# Patient Record
Sex: FEMALE | NOT HISPANIC OR LATINO | URBAN - METROPOLITAN AREA
[De-identification: names, ages, dates, MRNs, and addresses within clinical notes are randomized per-mention and may not be internally consistent; named-entity substitution may affect disease eponyms.]

---

## 2017-09-25 ENCOUNTER — HOSPITAL ENCOUNTER (OUTPATIENT)
Dept: MAMMOGRAPHY | Facility: CLINIC | Age: 57
Discharge: HOME | End: 2017-09-25

## 2017-09-25 DIAGNOSIS — Z12.31 VISIT FOR SCREENING MAMMOGRAM: ICD-10-CM

## 2018-10-02 ENCOUNTER — HOSPITAL ENCOUNTER (OUTPATIENT)
Dept: MAMMOGRAPHY | Facility: CLINIC | Age: 58
Discharge: HOME | End: 2018-10-02

## 2018-10-02 DIAGNOSIS — Z12.31 VISIT FOR SCREENING MAMMOGRAM: ICD-10-CM

## 2018-11-26 ENCOUNTER — TRANSFERRED RECORDS (OUTPATIENT)
Dept: HEALTH INFORMATION MANAGEMENT | Facility: CLINIC | Age: 58
End: 2018-11-26

## 2019-03-08 ENCOUNTER — TRANSFERRED RECORDS (OUTPATIENT)
Dept: HEALTH INFORMATION MANAGEMENT | Facility: CLINIC | Age: 59
End: 2019-03-08

## 2019-10-14 ENCOUNTER — HOSPITAL ENCOUNTER (OUTPATIENT)
Dept: MAMMOGRAPHY | Facility: CLINIC | Age: 59
Discharge: HOME | End: 2019-10-14

## 2019-10-14 ENCOUNTER — COMMUNICATION - HEALTHEAST (OUTPATIENT)
Dept: TELEHEALTH | Facility: CLINIC | Age: 59
End: 2019-10-14

## 2019-10-14 DIAGNOSIS — Z12.31 SCREENING MAMMOGRAM, ENCOUNTER FOR: ICD-10-CM

## 2020-03-18 ENCOUNTER — TRANSFERRED RECORDS (OUTPATIENT)
Dept: HEALTH INFORMATION MANAGEMENT | Facility: CLINIC | Age: 60
End: 2020-03-18

## 2020-09-01 ENCOUNTER — OFFICE VISIT (OUTPATIENT)
Age: 60
End: 2020-09-01

## 2021-02-26 ENCOUNTER — TRANSFERRED RECORDS (OUTPATIENT)
Dept: HEALTH INFORMATION MANAGEMENT | Facility: CLINIC | Age: 61
End: 2021-02-26

## 2021-03-05 ENCOUNTER — OFFICE VISIT (OUTPATIENT)
Dept: URBAN - METROPOLITAN AREA CLINIC 7 | Facility: CLINIC | Age: 61
End: 2021-03-05

## 2021-03-05 ENCOUNTER — LAB OUTSIDE AN ENCOUNTER (OUTPATIENT)
Age: 61
End: 2021-03-05

## 2021-03-05 LAB — MEDICARE: FECAL-OCCULT BLOOD TEST: POSITIVE

## 2021-03-10 ENCOUNTER — OFFICE VISIT (OUTPATIENT)
Dept: URBAN - METROPOLITAN AREA CLINIC 7 | Facility: CLINIC | Age: 61
End: 2021-03-10

## 2021-03-16 ENCOUNTER — OFFICE VISIT (OUTPATIENT)
Dept: URBAN - METROPOLITAN AREA CLINIC 9 | Facility: CLINIC | Age: 61
End: 2021-03-16

## 2021-03-23 ENCOUNTER — OFFICE VISIT (OUTPATIENT)
Dept: URBAN - METROPOLITAN AREA SURGERY CENTER 9 | Facility: SURGERY CENTER | Age: 61
End: 2021-03-23

## 2021-03-25 ENCOUNTER — TELEPHONE ENCOUNTER (OUTPATIENT)
Dept: URBAN - METROPOLITAN AREA CLINIC 9 | Facility: CLINIC | Age: 61
End: 2021-03-25

## 2021-04-01 ENCOUNTER — TELEPHONE ENCOUNTER (OUTPATIENT)
Dept: URBAN - METROPOLITAN AREA CLINIC 9 | Facility: CLINIC | Age: 61
End: 2021-04-01

## 2021-05-01 ENCOUNTER — OFFICE VISIT (OUTPATIENT)
Age: 61
End: 2021-05-01

## 2021-05-05 ENCOUNTER — TELEPHONE ENCOUNTER (OUTPATIENT)
Dept: URBAN - METROPOLITAN AREA CLINIC 9 | Facility: CLINIC | Age: 61
End: 2021-05-05

## 2021-05-06 ENCOUNTER — OFFICE VISIT (OUTPATIENT)
Dept: URBAN - METROPOLITAN AREA CLINIC 9 | Facility: CLINIC | Age: 61
End: 2021-05-06

## 2021-05-24 ENCOUNTER — RECORDS - HEALTHEAST (OUTPATIENT)
Dept: ADMINISTRATIVE | Facility: CLINIC | Age: 61
End: 2021-05-24

## 2021-05-25 ENCOUNTER — RECORDS - HEALTHEAST (OUTPATIENT)
Dept: ADMINISTRATIVE | Facility: CLINIC | Age: 61
End: 2021-05-25

## 2021-05-26 ENCOUNTER — RECORDS - HEALTHEAST (OUTPATIENT)
Dept: ADMINISTRATIVE | Facility: CLINIC | Age: 61
End: 2021-05-26

## 2021-05-27 ENCOUNTER — RECORDS - HEALTHEAST (OUTPATIENT)
Dept: ADMINISTRATIVE | Facility: CLINIC | Age: 61
End: 2021-05-27

## 2021-05-28 ENCOUNTER — RECORDS - HEALTHEAST (OUTPATIENT)
Dept: ADMINISTRATIVE | Facility: CLINIC | Age: 61
End: 2021-05-28

## 2021-05-29 ENCOUNTER — RECORDS - HEALTHEAST (OUTPATIENT)
Dept: ADMINISTRATIVE | Facility: CLINIC | Age: 61
End: 2021-05-29

## 2021-05-30 ENCOUNTER — RECORDS - HEALTHEAST (OUTPATIENT)
Dept: ADMINISTRATIVE | Facility: CLINIC | Age: 61
End: 2021-05-30

## 2021-06-15 ENCOUNTER — TELEPHONE ENCOUNTER (OUTPATIENT)
Dept: URBAN - METROPOLITAN AREA CLINIC 9 | Facility: CLINIC | Age: 61
End: 2021-06-15

## 2021-07-13 ENCOUNTER — RECORDS - HEALTHEAST (OUTPATIENT)
Dept: ADMINISTRATIVE | Facility: CLINIC | Age: 61
End: 2021-07-13

## 2021-07-21 ENCOUNTER — RECORDS - HEALTHEAST (OUTPATIENT)
Dept: ADMINISTRATIVE | Facility: CLINIC | Age: 61
End: 2021-07-21

## 2021-07-22 ENCOUNTER — RECORDS - HEALTHEAST (OUTPATIENT)
Dept: SCHEDULING | Facility: CLINIC | Age: 61
End: 2021-07-22

## 2021-07-22 DIAGNOSIS — Z12.31 OTHER SCREENING MAMMOGRAM: ICD-10-CM

## 2021-07-23 ENCOUNTER — RECORDS - HEALTHEAST (OUTPATIENT)
Dept: MAMMOGRAPHY | Facility: CLINIC | Age: 61
End: 2021-07-23

## 2021-07-23 DIAGNOSIS — N64.89 OTHER BREAST DISORDERS: ICD-10-CM

## 2021-07-23 DIAGNOSIS — Q84.9 CONGENITAL ANOMALY OF INTEGUMENT: ICD-10-CM

## 2021-10-29 ENCOUNTER — TRANSFERRED RECORDS (OUTPATIENT)
Dept: HEALTH INFORMATION MANAGEMENT | Facility: CLINIC | Age: 61
End: 2021-10-29

## 2021-10-29 ENCOUNTER — LAB REQUISITION (OUTPATIENT)
Dept: LAB | Facility: CLINIC | Age: 61
End: 2021-10-29

## 2021-10-29 DIAGNOSIS — K62.5 HEMORRHAGE OF ANUS AND RECTUM: ICD-10-CM

## 2021-10-29 DIAGNOSIS — R53.83 OTHER FATIGUE: ICD-10-CM

## 2021-10-29 LAB
ALBUMIN SERPL-MCNC: 3.6 G/DL (ref 3.5–5)
ALP SERPL-CCNC: 38 U/L (ref 45–120)
ALT SERPL W P-5'-P-CCNC: 22 U/L (ref 0–45)
ANION GAP SERPL CALCULATED.3IONS-SCNC: 12 MMOL/L (ref 5–18)
AST SERPL W P-5'-P-CCNC: 20 U/L (ref 0–40)
BILIRUB SERPL-MCNC: 0.3 MG/DL (ref 0–1)
BUN SERPL-MCNC: 5 MG/DL (ref 8–22)
CALCIUM SERPL-MCNC: 9.1 MG/DL (ref 8.5–10.5)
CHLORIDE BLD-SCNC: 109 MMOL/L (ref 98–107)
CO2 SERPL-SCNC: 21 MMOL/L (ref 22–31)
CREAT SERPL-MCNC: 0.73 MG/DL (ref 0.6–1.1)
FERRITIN SERPL-MCNC: 18 NG/ML (ref 10–130)
GFR SERPL CREATININE-BSD FRML MDRD: 90 ML/MIN/1.73M2
GLUCOSE BLD-MCNC: 99 MG/DL (ref 70–125)
IRON SATN MFR SERPL: 7 % (ref 20–50)
IRON SERPL-MCNC: 22 UG/DL (ref 42–175)
POTASSIUM BLD-SCNC: 3.7 MMOL/L (ref 3.5–5)
PROT SERPL-MCNC: 6.4 G/DL (ref 6–8)
SODIUM SERPL-SCNC: 142 MMOL/L (ref 136–145)
TIBC SERPL-MCNC: 321 UG/DL (ref 313–563)
TRANSFERRIN SERPL-MCNC: 257 MG/DL (ref 212–360)
TSH SERPL DL<=0.005 MIU/L-ACNC: 1.09 UIU/ML (ref 0.3–5)
VIT B12 SERPL-MCNC: 617 PG/ML (ref 213–816)

## 2021-10-29 PROCEDURE — 82306 VITAMIN D 25 HYDROXY: CPT | Performed by: NURSE PRACTITIONER

## 2021-10-29 PROCEDURE — 83540 ASSAY OF IRON: CPT | Performed by: NURSE PRACTITIONER

## 2021-10-29 PROCEDURE — 82728 ASSAY OF FERRITIN: CPT | Performed by: NURSE PRACTITIONER

## 2021-10-29 PROCEDURE — 84443 ASSAY THYROID STIM HORMONE: CPT | Performed by: NURSE PRACTITIONER

## 2021-10-29 PROCEDURE — 82607 VITAMIN B-12: CPT | Performed by: NURSE PRACTITIONER

## 2021-10-29 PROCEDURE — 80053 COMPREHEN METABOLIC PANEL: CPT | Performed by: NURSE PRACTITIONER

## 2021-10-30 LAB — DEPRECATED CALCIDIOL+CALCIFEROL SERPL-MC: 46 UG/L (ref 30–80)

## 2021-11-08 ENCOUNTER — TRANSFERRED RECORDS (OUTPATIENT)
Dept: HEALTH INFORMATION MANAGEMENT | Facility: CLINIC | Age: 61
End: 2021-11-08

## 2021-11-10 ENCOUNTER — TELEPHONE ENCOUNTER (OUTPATIENT)
Dept: URBAN - METROPOLITAN AREA CLINIC 9 | Facility: CLINIC | Age: 61
End: 2021-11-10

## 2021-11-22 ENCOUNTER — OFFICE VISIT (OUTPATIENT)
Dept: URBAN - METROPOLITAN AREA CLINIC 9 | Facility: CLINIC | Age: 61
End: 2021-11-22

## 2021-11-22 ENCOUNTER — TELEPHONE ENCOUNTER (OUTPATIENT)
Dept: URBAN - METROPOLITAN AREA CLINIC 9 | Facility: CLINIC | Age: 61
End: 2021-11-22

## 2021-11-24 ENCOUNTER — OFFICE VISIT (OUTPATIENT)
Dept: URBAN - METROPOLITAN AREA SURGERY CENTER 9 | Facility: SURGERY CENTER | Age: 61
End: 2021-11-24

## 2021-12-01 ENCOUNTER — TELEPHONE ENCOUNTER (OUTPATIENT)
Dept: URBAN - METROPOLITAN AREA CLINIC 9 | Facility: CLINIC | Age: 61
End: 2021-12-01

## 2021-12-01 LAB — CALPROTECTIN, STOOL: (no result)

## 2021-12-02 ENCOUNTER — OFFICE VISIT (OUTPATIENT)
Dept: URBAN - METROPOLITAN AREA CLINIC 9 | Facility: CLINIC | Age: 61
End: 2021-12-02

## 2021-12-07 ENCOUNTER — OFFICE VISIT (OUTPATIENT)
Dept: URBAN - METROPOLITAN AREA CLINIC 9 | Facility: CLINIC | Age: 61
End: 2021-12-07

## 2021-12-08 ENCOUNTER — TELEPHONE ENCOUNTER (OUTPATIENT)
Dept: URBAN - METROPOLITAN AREA CLINIC 9 | Facility: CLINIC | Age: 61
End: 2021-12-08

## 2021-12-09 ENCOUNTER — TELEPHONE ENCOUNTER (OUTPATIENT)
Dept: URBAN - METROPOLITAN AREA CLINIC 9 | Facility: CLINIC | Age: 61
End: 2021-12-09

## 2021-12-10 ENCOUNTER — TELEPHONE ENCOUNTER (OUTPATIENT)
Dept: URBAN - METROPOLITAN AREA CLINIC 9 | Facility: CLINIC | Age: 61
End: 2021-12-10

## 2021-12-10 LAB
HEPATITIS A AB, TOTAL: (no result)
HEPATITIS A IGM: (no result)
HEPATITIS B CORE AB TOTAL: (no result)
HEPATITIS B CORE ANTIBODY (IGM): (no result)
HEPATITIS B SURFACE ANTIBODY QL: (no result)
HEPATITIS B SURFACE ANTIGEN: (no result)
MITOGEN-NIL: (no result)
NIL: (no result)
QUANTIFERON(R)-TB GOLD PLUS, 1 TUBE: NEGATIVE
TB1-NIL: (no result)
TB2-NIL: (no result)

## 2021-12-13 ENCOUNTER — TELEPHONE ENCOUNTER (OUTPATIENT)
Dept: URBAN - METROPOLITAN AREA CLINIC 9 | Facility: CLINIC | Age: 61
End: 2021-12-13

## 2021-12-20 ENCOUNTER — OFFICE VISIT (OUTPATIENT)
Dept: URBAN - METROPOLITAN AREA CLINIC 9 | Facility: CLINIC | Age: 61
End: 2021-12-20

## 2021-12-27 ENCOUNTER — TELEPHONE ENCOUNTER (OUTPATIENT)
Dept: URBAN - METROPOLITAN AREA CLINIC 9 | Facility: CLINIC | Age: 61
End: 2021-12-27

## 2022-01-03 ENCOUNTER — TELEPHONE ENCOUNTER (OUTPATIENT)
Dept: URBAN - METROPOLITAN AREA CLINIC 9 | Facility: CLINIC | Age: 62
End: 2022-01-03

## 2022-01-19 ENCOUNTER — LAB OUTSIDE AN ENCOUNTER (OUTPATIENT)
Age: 62
End: 2022-01-19

## 2022-01-24 ENCOUNTER — OFFICE VISIT (OUTPATIENT)
Dept: URBAN - METROPOLITAN AREA CLINIC 9 | Facility: CLINIC | Age: 62
End: 2022-01-24

## 2022-01-24 LAB — CALPROTECTIN, STOOL: (no result)

## 2022-01-26 ENCOUNTER — TELEPHONE ENCOUNTER (OUTPATIENT)
Dept: URBAN - METROPOLITAN AREA CLINIC 9 | Facility: CLINIC | Age: 62
End: 2022-01-26

## 2022-02-08 ENCOUNTER — TELEPHONE ENCOUNTER (OUTPATIENT)
Dept: URBAN - METROPOLITAN AREA CLINIC 9 | Facility: CLINIC | Age: 62
End: 2022-02-08

## 2022-02-17 ENCOUNTER — TELEPHONE ENCOUNTER (OUTPATIENT)
Dept: URBAN - METROPOLITAN AREA CLINIC 9 | Facility: CLINIC | Age: 62
End: 2022-02-17

## 2022-02-23 ENCOUNTER — TELEPHONE ENCOUNTER (OUTPATIENT)
Dept: URBAN - METROPOLITAN AREA CLINIC 9 | Facility: CLINIC | Age: 62
End: 2022-02-23

## 2022-03-08 ENCOUNTER — TRANSFERRED RECORDS (OUTPATIENT)
Dept: ADMINISTRATIVE | Facility: CLINIC | Age: 62
End: 2022-03-08

## 2022-03-16 ENCOUNTER — TELEPHONE ENCOUNTER (OUTPATIENT)
Dept: URBAN - METROPOLITAN AREA CLINIC 9 | Facility: CLINIC | Age: 62
End: 2022-03-16

## 2022-04-14 ENCOUNTER — LAB OUTSIDE AN ENCOUNTER (OUTPATIENT)
Age: 62
End: 2022-04-14

## 2022-04-18 ENCOUNTER — TELEPHONE ENCOUNTER (OUTPATIENT)
Dept: URBAN - METROPOLITAN AREA CLINIC 9 | Facility: CLINIC | Age: 62
End: 2022-04-18

## 2022-04-22 ENCOUNTER — TELEPHONE ENCOUNTER (OUTPATIENT)
Dept: URBAN - METROPOLITAN AREA CLINIC 9 | Facility: CLINIC | Age: 62
End: 2022-04-22

## 2022-04-22 LAB — CALPROTECTIN, STOOL: (no result)

## 2022-04-25 ENCOUNTER — OFFICE VISIT (OUTPATIENT)
Dept: URBAN - METROPOLITAN AREA CLINIC 9 | Facility: CLINIC | Age: 62
End: 2022-04-25

## 2022-05-06 ENCOUNTER — TELEPHONE ENCOUNTER (OUTPATIENT)
Dept: URBAN - METROPOLITAN AREA CLINIC 9 | Facility: CLINIC | Age: 62
End: 2022-05-06

## 2022-05-10 ENCOUNTER — TELEPHONE ENCOUNTER (OUTPATIENT)
Dept: URBAN - METROPOLITAN AREA CLINIC 9 | Facility: CLINIC | Age: 62
End: 2022-05-10

## 2022-05-11 ENCOUNTER — TELEPHONE ENCOUNTER (OUTPATIENT)
Dept: URBAN - METROPOLITAN AREA CLINIC 9 | Facility: CLINIC | Age: 62
End: 2022-05-11

## 2022-06-17 ENCOUNTER — TELEPHONE ENCOUNTER (OUTPATIENT)
Dept: URBAN - METROPOLITAN AREA CLINIC 9 | Facility: CLINIC | Age: 62
End: 2022-06-17

## 2022-07-06 ENCOUNTER — TELEPHONE ENCOUNTER (OUTPATIENT)
Dept: URBAN - METROPOLITAN AREA CLINIC 9 | Facility: CLINIC | Age: 62
End: 2022-07-06

## 2022-07-09 ENCOUNTER — TELEPHONE ENCOUNTER (OUTPATIENT)
Dept: URBAN - METROPOLITAN AREA CLINIC 121 | Facility: CLINIC | Age: 62
End: 2022-07-09

## 2022-07-10 ENCOUNTER — TELEPHONE ENCOUNTER (OUTPATIENT)
Dept: URBAN - METROPOLITAN AREA CLINIC 121 | Facility: CLINIC | Age: 62
End: 2022-07-10

## 2022-07-30 ENCOUNTER — TELEPHONE ENCOUNTER (OUTPATIENT)
Age: 62
End: 2022-07-30

## 2022-07-30 RX ORDER — SODIUM SULFATE, POTASSIUM SULFATE, MAGNESIUM SULFATE 17.5; 3.13; 1.6 G/ML; G/ML; G/ML
1 (ONE) SOLUTION, CONCENTRATE ORAL
Qty: 0 | Refills: 2 | OUTPATIENT
Start: 2021-11-22 | End: 2021-11-23

## 2022-07-30 RX ORDER — DICYCLOMINE HYDROCHLORIDE 10 MG/1
1 (ONE) CAPSULE ORAL
Qty: 0 | Refills: 2 | OUTPATIENT
Start: 2015-02-10 | End: 2015-03-12

## 2022-07-30 RX ORDER — MESALAMINE 800 MG/1
1 (ONE) TABLET, DELAYED RELEASE ORAL
Qty: 0 | Refills: 4 | OUTPATIENT
Start: 2021-12-01 | End: 2021-12-07

## 2022-07-30 RX ORDER — FAMOTIDINE 10 MG
1 (ONE) TABLET ORAL
Qty: 0 | Refills: 16 | OUTPATIENT
Start: 2017-01-06 | End: 2021-03-08

## 2022-07-30 RX ORDER — CIPROFLOXACIN HYDROCHLORIDE 500 MG/1
1 (ONE) TABLET, FILM COATED ORAL
Qty: 0 | Refills: 2 | OUTPATIENT
Start: 2016-04-27 | End: 2016-05-07

## 2022-07-30 RX ORDER — PREDNISONE 10 MG/1
1 (ONE) TABLET ORAL AS DIRECTED
Qty: 0 | Refills: 2 | OUTPATIENT
Start: 2021-11-24 | End: 2021-12-07

## 2022-07-30 RX ORDER — USTEKINUMAB 130 MG/26ML
390 SOLUTION INTRAVENOUS AS DIRECTED
Qty: 0 | Refills: 2 | OUTPATIENT
Start: 2022-02-28 | End: 2022-03-01

## 2022-07-30 RX ORDER — METRONIDAZOLE 375 MG/1
1 CAPSULE ORAL
Qty: 0 | Refills: 2 | OUTPATIENT
Start: 2012-11-06 | End: 2012-11-20

## 2022-07-30 RX ORDER — SULFASALAZINE 500 MG/1
1 (ONE) TABLET ORAL QID
Qty: 0 | Refills: 5 | OUTPATIENT
Start: 2021-11-24 | End: 2021-12-07

## 2022-07-30 RX ORDER — MESALAMINE 1000 MG/1
1 (ONE) SUPPOSITORY RECTAL QHS
Qty: 0 | Refills: 3 | OUTPATIENT
Start: 2021-11-17 | End: 2021-12-20

## 2022-07-30 RX ORDER — LINACLOTIDE 72 UG/1
1 (ONE) CAPSULE, GELATIN COATED ORAL
Qty: 0 | Refills: 2 | OUTPATIENT
Start: 2021-11-30 | End: 2021-12-07

## 2022-07-30 RX ORDER — NYSTATIN 100000 [USP'U]/ML
5 SUSPENSION ORAL
Qty: 0 | Refills: 2 | OUTPATIENT
Start: 2021-12-13 | End: 2021-12-20

## 2022-07-30 RX ORDER — MESALAMINE 1000 MG/1
1 (ONE) SUPPOSITORY RECTAL QHS
Qty: 0 | Refills: 3 | OUTPATIENT
Start: 2022-02-22 | End: 2022-04-25

## 2022-07-30 RX ORDER — METRONIDAZOLE 500 MG/1
1 (ONE) TABLET ORAL
Qty: 0 | Refills: 2 | OUTPATIENT
Start: 2016-04-27 | End: 2016-05-07

## 2022-07-30 RX ORDER — FOLIC ACID 1 MG/1
1 (ONE) TABLET ORAL DAILY
Qty: 0 | Refills: 3 | OUTPATIENT
Start: 2021-11-24 | End: 2021-12-07

## 2022-07-30 RX ORDER — DEXLANSOPRAZOLE 60 MG/1
1 (ONE) CAPSULE, DELAYED RELEASE ORAL DAILY
Qty: 0 | Refills: 2 | OUTPATIENT
Start: 2015-01-28 | End: 2015-02-07

## 2022-07-30 RX ORDER — CIPROFLOXACIN HCL 500 MG
1 (ONE) TABLET TABLET ORAL
Qty: 0 | Refills: 3 | OUTPATIENT
Start: 2012-11-06 | End: 2016-04-27

## 2022-07-30 RX ORDER — BUDESONIDE 3 MG/1
3 (THREE) CAPSULE, COATED PELLETS ORAL AS DIRECTED
Qty: 0 | Refills: 2 | OUTPATIENT
Start: 2021-12-07 | End: 2021-12-07

## 2022-07-30 RX ORDER — SODIUM SULFATE, POTASSIUM SULFATE, MAGNESIUM SULFATE 17.5; 3.13; 1.6 G/ML; G/ML; G/ML
1 (ONE) SOLUTION, CONCENTRATE ORAL
Qty: 0 | Refills: 2 | OUTPATIENT
Start: 2021-03-16 | End: 2021-03-17

## 2022-07-30 RX ORDER — LINACLOTIDE 290 UG/1
1 (ONE) CAPSULE, GELATIN COATED ORAL DAILY
Qty: 0 | Refills: 2 | OUTPATIENT
Start: 2021-12-07 | End: 2021-12-20

## 2022-07-31 ENCOUNTER — TELEPHONE ENCOUNTER (OUTPATIENT)
Age: 62
End: 2022-07-31

## 2022-07-31 RX ORDER — METRONIDAZOLE 500 MG/1
1 (ONE) TABLET ORAL
Qty: 0 | Refills: 2 | Status: ACTIVE | COMMUNITY
Start: 2016-04-27

## 2022-07-31 RX ORDER — PREDNISONE 10 MG/1
1 (ONE) TABLET ORAL AS DIRECTED
Qty: 0 | Refills: 2 | Status: ACTIVE | COMMUNITY
Start: 2021-11-24

## 2022-07-31 RX ORDER — FAMOTIDINE 10 MG
1 (ONE) TABLET ORAL
Qty: 0 | Refills: 16 | Status: ACTIVE | COMMUNITY
Start: 2017-01-06

## 2022-07-31 RX ORDER — MESALAMINE 1000 MG/1
1 (ONE) SUPPOSITORY RECTAL QHS
Qty: 0 | Refills: 3 | Status: ACTIVE | COMMUNITY
Start: 2021-03-23

## 2022-07-31 RX ORDER — MESALAMINE 1000 MG/1
1 (ONE) SUPPOSITORY RECTAL QHS
Qty: 0 | Refills: 3 | Status: ACTIVE | COMMUNITY
Start: 2021-11-17

## 2022-07-31 RX ORDER — SODIUM SULFATE, POTASSIUM SULFATE, MAGNESIUM SULFATE 17.5; 3.13; 1.6 G/ML; G/ML; G/ML
1 (ONE) SOLUTION, CONCENTRATE ORAL
Qty: 0 | Refills: 2 | Status: ACTIVE | COMMUNITY
Start: 2021-03-16

## 2022-07-31 RX ORDER — MESALAMINE 800 MG/1
1 (ONE) TABLET, DELAYED RELEASE ORAL
Qty: 0 | Refills: 4 | Status: ACTIVE | COMMUNITY
Start: 2021-11-30

## 2022-07-31 RX ORDER — LINACLOTIDE 290 UG/1
1 (ONE) CAPSULE, GELATIN COATED ORAL DAILY
Qty: 0 | Refills: 2 | Status: ACTIVE | COMMUNITY
Start: 2021-12-07

## 2022-07-31 RX ORDER — SODIUM SULFATE, POTASSIUM SULFATE, MAGNESIUM SULFATE 17.5; 3.13; 1.6 G/ML; G/ML; G/ML
1 (ONE) SOLUTION, CONCENTRATE ORAL
Qty: 0 | Refills: 2 | Status: ACTIVE | COMMUNITY
Start: 2021-11-22

## 2022-07-31 RX ORDER — SULFASALAZINE 500 MG/1
1 (ONE) TABLET ORAL QID
Qty: 0 | Refills: 5 | Status: ACTIVE | COMMUNITY
Start: 2021-11-24

## 2022-07-31 RX ORDER — DICYCLOMINE HYDROCHLORIDE 10 MG/1
1 (ONE) CAPSULE ORAL
Qty: 0 | Refills: 2 | Status: ACTIVE | COMMUNITY
Start: 2015-02-10

## 2022-07-31 RX ORDER — DEXLANSOPRAZOLE 60 MG/1
1 (ONE) CAPSULE, DELAYED RELEASE ORAL DAILY
Qty: 0 | Refills: 2 | Status: ACTIVE | COMMUNITY
Start: 2015-01-28

## 2022-07-31 RX ORDER — CIPROFLOXACIN HYDROCHLORIDE 500 MG/1
1 (ONE) TABLET, FILM COATED ORAL
Qty: 0 | Refills: 2 | Status: ACTIVE | COMMUNITY
Start: 2016-04-27

## 2022-07-31 RX ORDER — LINACLOTIDE 72 UG/1
1 (ONE) CAPSULE, GELATIN COATED ORAL
Qty: 0 | Refills: 2 | Status: ACTIVE | COMMUNITY
Start: 2021-11-30

## 2022-07-31 RX ORDER — USTEKINUMAB 90 MG/ML
90 INJECTION, SOLUTION SUBCUTANEOUS
Qty: 0 | Refills: 6 | Status: ACTIVE | COMMUNITY
Start: 2022-02-28

## 2022-07-31 RX ORDER — USTEKINUMAB 130 MG/26ML
SOLUTION INTRAVENOUS AS DIRECTED
Qty: 0 | Refills: 2 | Status: ACTIVE | COMMUNITY
Start: 2022-02-28

## 2022-07-31 RX ORDER — USTEKINUMAB 90 MG/ML
90 INJECTION, SOLUTION SUBCUTANEOUS
Qty: 0 | Refills: 2 | Status: ACTIVE | COMMUNITY
Start: 2022-07-06

## 2022-07-31 RX ORDER — BUDESONIDE 3 MG/1
3 (THREE) CAPSULE, COATED PELLETS ORAL AS DIRECTED
Qty: 0 | Refills: 2 | Status: ACTIVE | COMMUNITY
Start: 2021-12-07

## 2022-07-31 RX ORDER — NYSTATIN 100000 [USP'U]/ML
5 SUSPENSION ORAL
Qty: 0 | Refills: 2 | Status: ACTIVE | COMMUNITY
Start: 2021-12-13

## 2022-07-31 RX ORDER — FOLIC ACID 1 MG/1
1 (ONE) TABLET ORAL DAILY
Qty: 0 | Refills: 3 | Status: ACTIVE | COMMUNITY
Start: 2021-11-24

## 2022-07-31 RX ORDER — METRONIDAZOLE 375 MG/1
1 CAPSULE ORAL
Qty: 0 | Refills: 2 | Status: ACTIVE | COMMUNITY
Start: 2012-11-06

## 2022-07-31 RX ORDER — USTEKINUMAB 90 MG/ML
90 INJECTION, SOLUTION SUBCUTANEOUS
Qty: 0 | Refills: 2 | Status: ACTIVE | COMMUNITY
Start: 2022-03-16

## 2022-07-31 RX ORDER — MESALAMINE 1000 MG/1
1 (ONE) SUPPOSITORY RECTAL QHS
Qty: 0 | Refills: 3 | Status: ACTIVE | COMMUNITY
Start: 2022-02-22

## 2022-07-31 RX ORDER — MESALAMINE 800 MG/1
1 (ONE) TABLET, DELAYED RELEASE ORAL
Qty: 0 | Refills: 4 | Status: ACTIVE | COMMUNITY
Start: 2021-12-01

## 2022-07-31 RX ORDER — CIPROFLOXACIN HCL 500 MG
1 (ONE) TABLET ORAL
Qty: 0 | Refills: 3 | Status: ACTIVE | COMMUNITY
Start: 2012-11-06

## 2022-08-04 ENCOUNTER — TRANSFERRED RECORDS (OUTPATIENT)
Dept: HEALTH INFORMATION MANAGEMENT | Facility: CLINIC | Age: 62
End: 2022-08-04

## 2022-08-04 ENCOUNTER — LAB (OUTPATIENT)
Dept: LAB | Facility: HOSPITAL | Age: 62
End: 2022-08-04
Payer: COMMERCIAL

## 2022-08-04 DIAGNOSIS — J30.9 SPASMODIC RHINORRHEA: Primary | ICD-10-CM

## 2022-08-04 LAB
Lab: NORMAL
PERFORMING LABORATORY: NORMAL
SPECIMEN STATUS: NORMAL
TEST NAME: NORMAL

## 2022-08-04 PROCEDURE — 86003 ALLG SPEC IGE CRUDE XTRC EA: CPT

## 2022-08-04 PROCEDURE — 84999 UNLISTED CHEMISTRY PROCEDURE: CPT

## 2022-08-04 PROCEDURE — 36415 COLL VENOUS BLD VENIPUNCTURE: CPT

## 2022-08-05 LAB
DEPRECATED MISC ALLERGEN IGE RAST QL: NORMAL
MISCELLANEOUS TEST 1 (ARUP): NORMAL

## 2022-08-06 LAB
A ALTERNATA IGE QN: <0.1 KU(A)/L
A FUMIGATUS IGE QN: <0.1 KU(A)/L
BERMUDA GRASS IGE QN: 0.19 KU(A)/L
C HERBARUM IGE QN: <0.1 KU(A)/L
CAT DANDER IGG QN: <0.1 KU(A)/L
CEDAR IGE QN: 0.12 KU(A)/L
CEDAR IGE QN: 0.23 KU(A)/L
COCKSFOOT IGE QN: 0.23 KU(A)/L
COMMON RAGWEED IGE QN: 0.39 KU(A)/L
COTTONWOOD IGE QN: 0.23 KU(A)/L
D FARINAE IGE QN: 1.3 KU(A)/L
D PTERONYSS IGE QN: 0.75 KU(A)/L
DEPRECATED IGE QN: 0.17 KU(A)/L
DOG DANDER+EPITH IGE QN: <0.1 KU(A)/L
FIREBUSH IGE QN: 0.17 KU(A)/L
GIANT RAGWEED IGE QN: 0.26 KU(A)/L
GOOSEFOOT IGE QN: 0.24 KU(A)/L
MAPLE IGE QN: 0.22 KU(A)/L
MUGWORT IGE QN: 0.14 KU(A)/L
NETTLE IGE QN: 0.22 KU(A)/L
P NOTATUM IGE QN: <0.1 KU(A)/L
S ROSTRATA IGE QN: <0.1 KU(A)/L
SILVER BIRCH IGE QN: 0.21 KU(A)/L
TIMOTHY IGE QN: 0.23 KU(A)/L
WHITE ASH IGE QN: 0.22 KU(A)/L
WHITE ELM IGE QN: 0.2 KU(A)/L
WHITE OAK IGE QN: 0.2 KU(A)/L

## 2022-08-09 ENCOUNTER — TELEPHONE ENCOUNTER (OUTPATIENT)
Dept: URBAN - METROPOLITAN AREA CLINIC 9 | Facility: CLINIC | Age: 62
End: 2022-08-09

## 2022-08-15 ENCOUNTER — OFFICE VISIT (OUTPATIENT)
Dept: URBAN - METROPOLITAN AREA TELEHEALTH 1 | Facility: TELEHEALTH | Age: 62
End: 2022-08-15

## 2022-08-15 NOTE — HPI-TODAY'S VISIT:
62-year-old female here for follow-up.  We last saw her about 4 months ago.  At prior visits: The patient is a 61 year old female, patient presents today for Crohn's disease. 52-year-old female who carries a diagnosis of Crohn's colitis is here to establish care.  She was first diagnosed with Crohn's in 1999 when she had abdominal pain.  It looks like the colonoscopy at that time had proctitis as well as ileitis as well as transverse colon erythema.  Biopsies showed active and chronic inflammation.  She was intermittently put on 5 ASA products since that time.  She is also diverticulitis since that time.  She is not been on any other drugs.  She actually has subsequent normal colonoscopies in 2011 2015.  She went to see [facility] in 2017 in the what sounds like a balloon enteroscopy in a capsule and a colonoscopy and it sounds like they told her she had just chronic constipation.  She thinks she had of Crohn's flare at that time but they did not treat her with anything she was on Lialda at that time and she says they just told her to continue that.  Since January she has had rectal bleeding.  Is pretty significant at first.  She had been off the Lialda so she restarted it and she said that can a decrease the bleeding.  But then she started having joint pain so she stopped it.  Currently still having frequent rectal bleeding.  She suffers from chronic constipation but on her current regimen of magnesium and milk of magnesium she is actually having daily bowel movements.  Does not have diarrhea.  Does not have abdominal pain.  Does not have weight loss.  Denies family history of GI malignancies.  She does have acid reflux but is concerned to be on chronic omeprazole as she has osteoporosis.  Recent labs to include CBC and iron were normal   we proceeded with colonoscopy and CT enterography: colonoscopy was normal except for patchy erythema in rectum and Dc (in DC  may have been  due to diverticular colitis)segmental  colonic biopsies were normal.  She only had some rectal chronic inflammation on rectal biopsies.  CTE in was normal.  We did put her on Canasa do the chronic inflammation on the rectal biopsies. Then had follow-up: She is been on daily Canasa for over a month.  She says she still getting daily rectal bleeding.  She will have a bowel movement in the morning or 2 and these are normal but in the afternoon she will have some blood in her stool or stools also fairly liquid.  She has had negative reactions to almost all oral mesalamine products to include sulfasalazine and Lialda.  She feels like the rectal bleeding is getting better on the Canasa but she still having it. At last visit she really just had proctitis on colonoscopy.  We put her on a trial of 1 month of budesonide enemas.  We did talk about being on oral mesalamine products which she felt like this caused joint issues but if we do we can try sulfasalazine we also told her to take Benefiber twice a day.  We switched her from Prilosec to Pepcid  She then had follow-up:  She said she started having daily heavy rectal bleeding since August.  She went up to [place] and was seeing a doctor there and they put her on oral budesonide and this has not changed anything.  She was on that for 2 months.  She is also had a change in bowel habits.  She is usually constipated and she does take magnesium.  But she feels that she is having several loose stools in the morning about 4 this is different than before.  Again started in August.  She is actually been gaining weight.  Recent labs in November showed a ferritin of 15.  CMP was normal.  Most recent hemoglobin was 11.7 She also has a lot of kind of phlegm in the back of her throat and she is seeing ENT next week.  She tried to take oral iron but that caused a lot of GI upset as well worsening constipation.  She is also been on the Canasa which helps somewhat but has not really resolved her rectal and she had joint pains when she took both mesalamine and Lialda.  When she was on budesonide enemas in the past that did help for several months.  She has been treated with prednisone in the past and she said this did help her symptoms  We proceeded with colonoscopy which showed 25 cm of contingous erythematous mucosa  Biopsy showed up as chronic inflammation.  We tried her back on sulfasalazine and she said she had an immediate reaction.  She also said she was constipated and wanted to try Linzess.  The one thing that is helped her in the past is not oral budesonide but topical budesonide.  Fecal Daniele protectant was 925.  We put her on a trial of prednisone.   She then had follow-up..  She said the prednisone at 40 made her shaky and not feeling well so she went down to 20.  She was only on 40 for a be a week.  She also is taking Linzess 290.  She was having a problem with constipation.  She will actually went down to prednisone 20 and she had a bowel movement today.  She says she is unable to tolerate any mesalamine products including the sulfasalazine.  And she had side effects with the 40 of prednisone.  Also they did not give her iron at [facility] as she is had anaphylaxis in the past to meds and she just on oral iron  We tried her on oral budesonide and she said she had side effects from this and could only tolerate 1 pill a day instead of 9 mg a day and this is what she is been treated with before.  She also says she cannot tolerate prednisone more than 20 mg a day.  She then had follow-up:.  She went back on the prednisone but is been taking 5 mg.  She was on 40 mg for 10 days then 20 mg for a few days and now is been on 5 mg.  She took Canasa for one month.  She says she is back to taking her daily milk of magnesia which is her chronic constipation regiment.  On that she is having no bleeding having one daily bowel movement a day. We talked about at last visit I do not know if she has UC or Crohn's she did have ileitis initial colonoscopy but for the most part has had proctitis.  On last colonoscopy she had a 5 cm of inflammation in her fecal Daniele protectant was very elevated.  She states she is unable to take any mesalamine products other than Canasa and she is unable to do prednisone of more than 5 mg and budesonide of more than 3 mg due to side effect.  Her last fecal Daniele protectant was 158 which is still elevated but grossly improved since last 1 1 which was 925 in November 26.  We did talk about putting her back on budesonide enemas that she could tolerate this in the past and I think would be sufficient for her extent of disease.  But as she was asymptomatic and has had a multitude of side effects we decided to hold off.  Of note we received labs on January 7 and her ferritin was 9 which is pretty much unchanged  She is here for follow-up today.  She saw him on who put her on an increased dose of oral iron 2 pills a day.  She said she is back to her baseline of constipation.  She stopped the Linzess and takes magnesium citrate her magnesium and on this has regular bowel movements.  At prior visits she was still having rectal bleeding's we put her on budesonide enemas.  As she was continued to have problems with rectal bleeding despite this we started Stelara.  We have recent labs which showed a ferritin of 9 and iron sat of 23.  Ferritin before this was 12.  Fecal Daniele protectant in January 19 was 158 this was after a steroid trial in December.  CBC and CMP were normal.  She then started on Stelara.  And last fecal Daniele protectant was 241 on April 14   She is here for follow-up today.  She had her Stelara induction on March 10.  Since that time she is a great improvement on her rectal bleeding she still having it once a month.  But that is much better than before.  She is back to being constipated and she has to take max it every day and on that she has a bowel movement every day.  Her only complaint is fatigue.  She was recently started on one oral iron pill a day.  She cannot have infusions as she stated she had a severe reaction to this. she was having some ear and dizziness issues in a CT scan which showed acute sinusitis.  At last visit she has had progression in her disease that she just had proctitis before and when I scoped her due to rectal bleeding she had left-sided disease up to 25 cm and her fecal calprotectin was 1 thousand.  We try to put her back on a mesalamine products but she had an immediate reaction.  We also tried her on prednisone and she had a reaction.  We also tried budesonide and she had a reaction.  She could do budesonide enemas.  Due to continued rectal bleeding we started her on Stelara on March 10.  We did tell her do a fecal calprotectin in 3 months that [place] was [place].  She is now calling as she has had constipation actually

## 2022-08-19 DIAGNOSIS — J45.909 ASTHMA: Primary | ICD-10-CM

## 2022-09-08 ENCOUNTER — HOSPITAL ENCOUNTER (OUTPATIENT)
Dept: CT IMAGING | Facility: HOSPITAL | Age: 62
Discharge: HOME | End: 2022-09-08
Admitting: INTERNAL MEDICINE
Payer: COMMERCIAL

## 2022-09-08 DIAGNOSIS — J45.20 MILD INTERMITTENT ASTHMA WITHOUT COMPLICATION: ICD-10-CM

## 2022-09-08 DIAGNOSIS — R91.1 LUNG NODULE: ICD-10-CM

## 2022-09-08 PROCEDURE — 71250 CT THORAX DX C-: CPT

## 2022-10-26 ENCOUNTER — OFFICE VISIT (OUTPATIENT)
Dept: PULMONOLOGY | Facility: OTHER | Age: 62
End: 2022-10-26
Payer: COMMERCIAL

## 2022-10-26 ENCOUNTER — ALLIED HEALTH/NURSE VISIT (OUTPATIENT)
Dept: PULMONOLOGY | Facility: OTHER | Age: 62
End: 2022-10-26
Payer: COMMERCIAL

## 2022-10-26 VITALS
HEART RATE: 76 BPM | SYSTOLIC BLOOD PRESSURE: 126 MMHG | WEIGHT: 168 LBS | DIASTOLIC BLOOD PRESSURE: 68 MMHG | HEIGHT: 65 IN | OXYGEN SATURATION: 100 % | BODY MASS INDEX: 27.99 KG/M2

## 2022-10-26 DIAGNOSIS — J45.40 MODERATE PERSISTENT ASTHMA, UNCOMPLICATED: Primary | ICD-10-CM

## 2022-10-26 DIAGNOSIS — J45.909 ASTHMA: ICD-10-CM

## 2022-10-26 LAB — HGB BLD-MCNC: 11.5 G/DL

## 2022-10-26 PROCEDURE — 99204 OFFICE O/P NEW MOD 45 MIN: CPT | Mod: 25 | Performed by: INTERNAL MEDICINE

## 2022-10-26 PROCEDURE — 94060 EVALUATION OF WHEEZING: CPT | Performed by: INTERNAL MEDICINE

## 2022-10-26 PROCEDURE — 94729 DIFFUSING CAPACITY: CPT | Performed by: INTERNAL MEDICINE

## 2022-10-26 PROCEDURE — 94726 PLETHYSMOGRAPHY LUNG VOLUMES: CPT | Performed by: INTERNAL MEDICINE

## 2022-10-26 PROCEDURE — 85018 HEMOGLOBIN: CPT

## 2022-10-26 RX ORDER — ALBUTEROL SULFATE 90 UG/1
2 AEROSOL, METERED RESPIRATORY (INHALATION) EVERY 6 HOURS PRN
COMMUNITY

## 2022-10-26 RX ORDER — EPINASTINE HCL 0.05 %
1 DROPS OPHTHALMIC (EYE)
COMMUNITY
End: 2023-05-31

## 2022-10-26 RX ORDER — MOMETASONE FUROATE 200 UG/1
2 AEROSOL RESPIRATORY (INHALATION) 2 TIMES DAILY
Qty: 13 G | Refills: 11 | Status: SHIPPED | OUTPATIENT
Start: 2022-10-26 | End: 2023-05-31

## 2022-10-26 RX ORDER — FAMOTIDINE 40 MG/1
40 TABLET, FILM COATED ORAL 2 TIMES DAILY PRN
COMMUNITY
End: 2023-05-31

## 2022-10-26 RX ORDER — ALBUTEROL SULFATE 0.83 MG/ML
2.5 SOLUTION RESPIRATORY (INHALATION) EVERY 6 HOURS PRN
COMMUNITY
End: 2023-05-31

## 2022-10-26 RX ORDER — FEXOFENADINE HCL 180 MG/1
TABLET ORAL EVERY 24 HOURS
COMMUNITY
Start: 2022-09-20

## 2022-10-26 RX ORDER — OLOPATADINE HYDROCHLORIDE 1 MG/ML
1 SOLUTION/ DROPS OPHTHALMIC PRN
COMMUNITY
End: 2023-09-19

## 2022-10-26 RX ORDER — TRAZODONE HYDROCHLORIDE 50 MG/1
50 TABLET, FILM COATED ORAL AT BEDTIME
COMMUNITY

## 2022-10-26 RX ORDER — ESTRADIOL AND NORETHINDRONE ACETATE 1; .5 MG/1; MG/1
1 TABLET ORAL DAILY
COMMUNITY
End: 2023-05-31

## 2022-10-26 RX ORDER — ALENDRONATE SODIUM 70 MG/1
70 TABLET ORAL
COMMUNITY
Start: 2022-02-21

## 2022-10-26 RX ORDER — BECLOMETHASONE DIPROPIONATE HFA 80 UG/1
1 AEROSOL, METERED RESPIRATORY (INHALATION) 2 TIMES DAILY
COMMUNITY
Start: 2022-10-17 | End: 2024-06-11 | Stop reason: ALTCHOICE

## 2022-10-26 ASSESSMENT — ASTHMA QUESTIONNAIRES
QUESTION_3 LAST FOUR WEEKS HOW OFTEN DID YOUR ASTHMA SYMPTOMS (WHEEZING, COUGHING, SHORTNESS OF BREATH, CHEST TIGHTNESS OR PAIN) WAKE YOU UP AT NIGHT OR EARLIER THAN USUAL IN THE MORNING: NOT AT ALL
ACT_TOTALSCORE: 24
QUESTION_4 LAST FOUR WEEKS HOW OFTEN HAVE YOU USED YOUR RESCUE INHALER OR NEBULIZER MEDICATION (SUCH AS ALBUTEROL): NOT AT ALL
ACT_TOTALSCORE: 24
QUESTION_1 LAST FOUR WEEKS HOW MUCH OF THE TIME DID YOUR ASTHMA KEEP YOU FROM GETTING AS MUCH DONE AT WORK, SCHOOL OR AT HOME: NONE OF THE TIME
QUESTION_5 LAST FOUR WEEKS HOW WOULD YOU RATE YOUR ASTHMA CONTROL: COMPLETELY CONTROLLED
QUESTION_2 LAST FOUR WEEKS HOW OFTEN HAVE YOU HAD SHORTNESS OF BREATH: ONCE OR TWICE A WEEK

## 2022-10-26 NOTE — PATIENT INSTRUCTIONS
It was good to see you in clinic today. This is what we discussed:    Start taking Asmanex 200 mcg two inhalations twice daily. Rinse, gargle, and spit water after use.  Continue fexofenadine one pill daily.  Use albuterol as needed.  You can take Mucinex up to 1200 mg twice daily as needed for excessive phlegm.  I recommend getting the bivalent COVID-19 booster.  You have very tiny lung spots that are not concerning. We do not need routine repeat chest CTs but we can continue to discuss.  I will see you in about 6 months.  Contact me with questions or concerns.    Darshan Pan MD  Pulmonary and Critical Care Medicine  [facility]  Office [phone]
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
## 2022-10-27 LAB
DLCOCOR-%PRED-PRE: 111 %
DLCOCOR-PRE: 22.9 ML/MIN/MMHG
DLCOUNC-%PRED-PRE: 104 %
DLCOUNC-PRE: 21.45 ML/MIN/MMHG
DLCOUNC-PRED: 20.57 ML/MIN/MMHG
ERV-%PRED-PRE: 18 %
ERV-PRE: 0.13 L
ERV-PRED: 0.7 L
EXPTIME-PRE: 7.4 SEC
FEF2575-%PRED-POST: 64 %
FEF2575-%PRED-PRE: 53 %
FEF2575-POST: 1.47 L/SEC
FEF2575-PRE: 1.23 L/SEC
FEF2575-PRED: 2.3 L/SEC
FEFMAX-%PRED-PRE: 82 %
FEFMAX-PRE: 5.23 L/SEC
FEFMAX-PRED: 6.37 L/SEC
FEV1-%PRED-PRE: 69 %
FEV1-PRE: 1.77 L
FEV1FEV6-PRE: 73 %
FEV1FEV6-PRED: 81 %
FEV1FVC-PRE: 73 %
FEV1FVC-PRED: 79 %
FEV1SVC-PRE: 70 %
FEV1SVC-PRED: 76 %
FIFMAX-PRE: 3.07 L/SEC
FRCPLETH-%PRED-PRE: 82 %
FRCPLETH-PRE: 2.26 L
FRCPLETH-PRED: 2.73 L
FVC-%PRED-PRE: 75 %
FVC-PRE: 2.42 L
FVC-PRED: 3.22 L
IC-%PRED-PRE: 83 %
IC-PRE: 2.19 L
IC-PRED: 2.64 L
RVPLETH-%PRED-PRE: 99 %
RVPLETH-PRE: 1.91 L
RVPLETH-PRED: 1.93 L
TLCPLETH-%PRED-PRE: 88 %
TLCPLETH-PRE: 4.45 L
TLCPLETH-PRED: 5.02 L
VA-%PRED-PRE: 82 %
VA-PRE: 4.04 L
VC-%PRED-PRE: 76 %
VC-PRE: 2.54 L
VC-PRED: 3.33 L

## 2022-10-29 ENCOUNTER — HEALTH MAINTENANCE LETTER (OUTPATIENT)
Age: 62
End: 2022-10-29

## 2022-11-02 ENCOUNTER — LAB REQUISITION (OUTPATIENT)
Dept: LAB | Facility: CLINIC | Age: 62
End: 2022-11-02

## 2022-11-02 DIAGNOSIS — E78.2 MIXED HYPERLIPIDEMIA: ICD-10-CM

## 2022-11-02 DIAGNOSIS — M81.0 AGE-RELATED OSTEOPOROSIS WITHOUT CURRENT PATHOLOGICAL FRACTURE: ICD-10-CM

## 2022-11-02 DIAGNOSIS — D50.9 IRON DEFICIENCY ANEMIA, UNSPECIFIED: ICD-10-CM

## 2022-11-02 LAB
ALBUMIN SERPL BCG-MCNC: 4.8 G/DL (ref 3.5–5.2)
ALP SERPL-CCNC: 51 U/L (ref 35–104)
ALT SERPL W P-5'-P-CCNC: 13 U/L (ref 10–35)
ANION GAP SERPL CALCULATED.3IONS-SCNC: 16 MMOL/L (ref 7–15)
AST SERPL W P-5'-P-CCNC: 18 U/L (ref 10–35)
BILIRUB SERPL-MCNC: 0.6 MG/DL
BUN SERPL-MCNC: 9.4 MG/DL (ref 8–23)
CALCIUM SERPL-MCNC: 10.2 MG/DL (ref 8.8–10.2)
CHLORIDE SERPL-SCNC: 100 MMOL/L (ref 98–107)
CHOLEST SERPL-MCNC: 318 MG/DL
CREAT SERPL-MCNC: 0.77 MG/DL (ref 0.51–0.95)
DEPRECATED HCO3 PLAS-SCNC: 22 MMOL/L (ref 22–29)
ERYTHROCYTE [DISTWIDTH] IN BLOOD BY AUTOMATED COUNT: 14.4 % (ref 10–15)
FERRITIN SERPL-MCNC: 24 NG/ML (ref 11–328)
GFR SERPL CREATININE-BSD FRML MDRD: 88 ML/MIN/1.73M2
GLUCOSE SERPL-MCNC: 81 MG/DL (ref 70–99)
HCT VFR BLD AUTO: 45.8 % (ref 35–47)
HDLC SERPL-MCNC: 58 MG/DL
HGB BLD-MCNC: 14.5 G/DL (ref 11.7–15.7)
IRON SATN MFR SERPL: 39 % (ref 15–46)
IRON SERPL-MCNC: 152 UG/DL (ref 35–180)
LDLC SERPL CALC-MCNC: 221 MG/DL
MCH RBC QN AUTO: 27.7 PG (ref 26.5–33)
MCHC RBC AUTO-ENTMCNC: 31.7 G/DL (ref 31.5–36.5)
MCV RBC AUTO: 88 FL (ref 78–100)
NONHDLC SERPL-MCNC: 260 MG/DL
PLATELET # BLD AUTO: 392 10E3/UL (ref 150–450)
POTASSIUM SERPL-SCNC: 4.2 MMOL/L (ref 3.4–5.3)
PROT SERPL-MCNC: 7.7 G/DL (ref 6.4–8.3)
RBC # BLD AUTO: 5.23 10E6/UL (ref 3.8–5.2)
SODIUM SERPL-SCNC: 138 MMOL/L (ref 136–145)
TIBC SERPL-MCNC: 389 UG/DL (ref 240–430)
TRIGL SERPL-MCNC: 197 MG/DL
VIT B12 SERPL-MCNC: 709 PG/ML (ref 232–1245)
WBC # BLD AUTO: 6.9 10E3/UL (ref 4–11)

## 2022-11-02 PROCEDURE — 80061 LIPID PANEL: CPT | Performed by: PHYSICIAN ASSISTANT

## 2022-11-02 PROCEDURE — 82607 VITAMIN B-12: CPT | Performed by: PHYSICIAN ASSISTANT

## 2022-11-02 PROCEDURE — 82306 VITAMIN D 25 HYDROXY: CPT | Performed by: PHYSICIAN ASSISTANT

## 2022-11-02 PROCEDURE — 82728 ASSAY OF FERRITIN: CPT | Performed by: PHYSICIAN ASSISTANT

## 2022-11-02 PROCEDURE — 80053 COMPREHEN METABOLIC PANEL: CPT | Performed by: PHYSICIAN ASSISTANT

## 2022-11-02 PROCEDURE — 83550 IRON BINDING TEST: CPT | Performed by: PHYSICIAN ASSISTANT

## 2022-11-02 PROCEDURE — 85027 COMPLETE CBC AUTOMATED: CPT | Performed by: PHYSICIAN ASSISTANT

## 2022-11-03 LAB — DEPRECATED CALCIDIOL+CALCIFEROL SERPL-MC: 69 UG/L (ref 20–75)

## 2023-01-12 ENCOUNTER — TELEPHONE ENCOUNTER (OUTPATIENT)
Dept: URBAN - METROPOLITAN AREA CLINIC 9 | Facility: CLINIC | Age: 63
End: 2023-01-12

## 2023-02-23 ENCOUNTER — WEB ENCOUNTER (OUTPATIENT)
Dept: URBAN - METROPOLITAN AREA CLINIC 9 | Facility: CLINIC | Age: 63
End: 2023-02-23

## 2023-02-27 ENCOUNTER — WEB ENCOUNTER (OUTPATIENT)
Dept: URBAN - METROPOLITAN AREA CLINIC 9 | Facility: CLINIC | Age: 63
End: 2023-02-27

## 2023-04-03 ENCOUNTER — DASHBOARD ENCOUNTERS (OUTPATIENT)
Age: 63
End: 2023-04-03

## 2023-04-03 ENCOUNTER — WEB ENCOUNTER (OUTPATIENT)
Dept: URBAN - METROPOLITAN AREA CLINIC 9 | Facility: CLINIC | Age: 63
End: 2023-04-03

## 2023-04-03 ENCOUNTER — TELEPHONE ENCOUNTER (OUTPATIENT)
Dept: URBAN - METROPOLITAN AREA CLINIC 9 | Facility: CLINIC | Age: 63
End: 2023-04-03

## 2023-04-03 ENCOUNTER — OFFICE VISIT (OUTPATIENT)
Dept: URBAN - METROPOLITAN AREA CLINIC 9 | Facility: CLINIC | Age: 63
End: 2023-04-03
Payer: COMMERCIAL

## 2023-04-03 VITALS
HEIGHT: 64 IN | SYSTOLIC BLOOD PRESSURE: 158 MMHG | BODY MASS INDEX: 28.34 KG/M2 | WEIGHT: 166 LBS | DIASTOLIC BLOOD PRESSURE: 92 MMHG

## 2023-04-03 DIAGNOSIS — K50.90 CROHN'S DISEASE: ICD-10-CM

## 2023-04-03 PROCEDURE — 99215 OFFICE O/P EST HI 40 MIN: CPT | Performed by: INTERNAL MEDICINE

## 2023-04-03 RX ORDER — CIPROFLOXACIN HCL 500 MG
1 (ONE) TABLET ORAL
Qty: 0 | Refills: 3 | Status: DISCONTINUED | COMMUNITY
Start: 2012-11-06

## 2023-04-03 RX ORDER — VEDOLIZUMAB 300 MG/5ML
AS DIRECTED INJECTION, POWDER, LYOPHILIZED, FOR SOLUTION INTRAVENOUS
Qty: 3 | Refills: 0 | OUTPATIENT
Start: 2023-04-03 | End: 2023-05-03

## 2023-04-03 RX ORDER — MESALAMINE 800 MG/1
1 (ONE) TABLET, DELAYED RELEASE ORAL
Qty: 0 | Refills: 4 | Status: DISCONTINUED | COMMUNITY
Start: 2021-11-30

## 2023-04-03 RX ORDER — USTEKINUMAB 90 MG/ML
90 INJECTION, SOLUTION SUBCUTANEOUS
Qty: 0 | Refills: 6 | Status: ACTIVE | COMMUNITY
Start: 2022-02-28

## 2023-04-03 RX ORDER — PREDNISONE 10 MG/1
1 (ONE) TABLET ORAL AS DIRECTED
Qty: 0 | Refills: 2 | Status: DISCONTINUED | COMMUNITY
Start: 2021-11-24

## 2023-04-03 RX ORDER — LINACLOTIDE 290 UG/1
1 (ONE) CAPSULE, GELATIN COATED ORAL DAILY
Qty: 0 | Refills: 2 | Status: DISCONTINUED | COMMUNITY
Start: 2021-12-07

## 2023-04-03 RX ORDER — DICYCLOMINE HYDROCHLORIDE 10 MG/1
1 (ONE) CAPSULE ORAL
Qty: 0 | Refills: 2 | Status: DISCONTINUED | COMMUNITY
Start: 2015-02-10

## 2023-04-03 RX ORDER — MESALAMINE 1000 MG/1
1 (ONE) SUPPOSITORY RECTAL QHS
Qty: 0 | Refills: 3 | Status: DISCONTINUED | COMMUNITY
Start: 2021-03-23

## 2023-04-03 RX ORDER — VEDOLIZUMAB 300 MG/5ML
AS DIRECTED INJECTION, POWDER, LYOPHILIZED, FOR SOLUTION INTRAVENOUS
Qty: 8 | Refills: 0 | OUTPATIENT
Start: 2023-04-03 | End: 2023-06-02

## 2023-04-03 RX ORDER — ESTRADIOL AND NORETHINDRONE ACETATE .5; .1 MG/1; MG/1
TABLET, FILM COATED ORAL
Qty: 28 TABLET | Refills: 3 | Status: ACTIVE | COMMUNITY

## 2023-04-03 RX ORDER — MAGNESIUM HYDROXIDE 1200 MG/15ML
5 ML AT LEAST 4 HOURS BETWEEN DOSES AS NEEDED LIQUID ORAL
Status: ACTIVE | COMMUNITY
Start: 2023-04-03

## 2023-04-03 RX ORDER — CIPROFLOXACIN HYDROCHLORIDE 500 MG/1
1 (ONE) TABLET, FILM COATED ORAL
Qty: 0 | Refills: 2 | Status: DISCONTINUED | COMMUNITY
Start: 2016-04-27

## 2023-04-03 RX ORDER — FOLIC ACID 1 MG/1
1 (ONE) TABLET ORAL DAILY
Qty: 0 | Refills: 3 | Status: DISCONTINUED | COMMUNITY
Start: 2021-11-24

## 2023-04-03 RX ORDER — FERROUS SULFATE 325(65) MG
1 TABLET TABLET ORAL ONCE A DAY
Qty: 30 | Status: ACTIVE | COMMUNITY
Start: 2023-04-03

## 2023-04-03 RX ORDER — RISANKIZUMAB-RZAA 360 MG/2.4
AS DIRECTED WEARABLE INJECTOR SUBCUTANEOUS
Qty: 90 | Refills: 3 | OUTPATIENT
Start: 2023-04-05 | End: 2024-03-31

## 2023-04-03 RX ORDER — SULFASALAZINE 500 MG/1
1 (ONE) TABLET ORAL QID
Qty: 0 | Refills: 5 | Status: DISCONTINUED | COMMUNITY
Start: 2021-11-24

## 2023-04-03 RX ORDER — TRAZODONE HYDROCHLORIDE 50 MG/1
TABLET ORAL
Qty: 30 TABLET | Refills: 0 | Status: ACTIVE | COMMUNITY

## 2023-04-03 RX ORDER — MOMETASONE FUROATE 100 UG/1
AEROSOL RESPIRATORY (INHALATION)
Qty: 13 GRAM | Refills: 0 | Status: ACTIVE | COMMUNITY

## 2023-04-03 RX ORDER — LINACLOTIDE 72 UG/1
1 (ONE) CAPSULE, GELATIN COATED ORAL
Qty: 0 | Refills: 2 | Status: DISCONTINUED | COMMUNITY
Start: 2021-11-30

## 2023-04-03 RX ORDER — SODIUM SULFATE, POTASSIUM SULFATE, MAGNESIUM SULFATE 17.5; 3.13; 1.6 G/ML; G/ML; G/ML
1 (ONE) SOLUTION, CONCENTRATE ORAL
Qty: 0 | Refills: 2 | Status: DISCONTINUED | COMMUNITY
Start: 2021-11-22

## 2023-04-03 RX ORDER — FAMOTIDINE 10 MG
1 (ONE) TABLET ORAL
Qty: 0 | Refills: 16 | Status: DISCONTINUED | COMMUNITY
Start: 2017-01-06

## 2023-04-03 RX ORDER — ALENDRONATE SODIUM 70 MG/1
1 TABLET TABLET ORAL
Status: ACTIVE | COMMUNITY
Start: 2023-04-03 | End: 2023-05-03

## 2023-04-03 RX ORDER — USTEKINUMAB 130 MG/26ML
SOLUTION INTRAVENOUS AS DIRECTED
Qty: 0 | Refills: 2 | Status: DISCONTINUED | COMMUNITY
Start: 2022-02-28

## 2023-04-03 RX ORDER — RISANKIZUMAB-RZAA 60 MG/ML
AS DIRECTED INJECTION INTRAVENOUS
Qty: 30 MILLILITER | Refills: 0 | OUTPATIENT
Start: 2023-04-05 | End: 2023-06-04

## 2023-04-03 RX ORDER — NYSTATIN 100000 [USP'U]/ML
5 SUSPENSION ORAL
Qty: 0 | Refills: 2 | Status: DISCONTINUED | COMMUNITY
Start: 2021-12-13

## 2023-04-03 RX ORDER — OMEPRAZOLE 20 MG/1
1 CAPSULE 30 MINUTES BEFORE MORNING MEAL CAPSULE, DELAYED RELEASE ORAL ONCE A DAY
Qty: 30 | Status: ACTIVE | COMMUNITY
Start: 2023-04-03

## 2023-04-03 RX ORDER — METRONIDAZOLE 375 MG/1
1 CAPSULE ORAL
Qty: 0 | Refills: 2 | Status: DISCONTINUED | COMMUNITY
Start: 2012-11-06

## 2023-04-03 RX ORDER — BUDESONIDE 3 MG/1
3 (THREE) CAPSULE, COATED PELLETS ORAL AS DIRECTED
Qty: 0 | Refills: 2 | Status: DISCONTINUED | COMMUNITY
Start: 2021-12-07

## 2023-04-03 RX ORDER — METRONIDAZOLE 500 MG/1
1 (ONE) TABLET ORAL
Qty: 0 | Refills: 2 | Status: DISCONTINUED | COMMUNITY
Start: 2016-04-27

## 2023-04-03 RX ORDER — DEXLANSOPRAZOLE 60 MG/1
1 (ONE) CAPSULE, DELAYED RELEASE ORAL DAILY
Qty: 0 | Refills: 2 | Status: DISCONTINUED | COMMUNITY
Start: 2015-01-28

## 2023-04-03 RX ORDER — ROSUVASTATIN CALCIUM 20 MG/1
TABLET, FILM COATED ORAL
Qty: 90 TABLET | Refills: 0 | Status: ACTIVE | COMMUNITY

## 2023-04-03 NOTE — HPI-TODAY'S VISIT:
62-year-old female here for follow-up.  We last saw her about 4 months ago.  At prior visits: The patient is a 61 year old female, patient presents today for Crohn's disease. 52-year-old female who carries a diagnosis of Crohn's colitis is here to establish care.  She was first diagnosed with Crohn's in 1999 when she had abdominal pain.  It looks like the colonoscopy at that time had proctitis as well as ileitis as well as transverse colon erythema.  Biopsies showed active and chronic inflammation.  She was intermittently put on 5 ASA products since that time.  She is also diverticulitis since that time.  She is not been on any other drugs.  She actually has subsequent normal colonoscopies in 2011 2015.  She went to see [facility] in 2017 in the what sounds like a balloon enteroscopy in a capsule and a colonoscopy and it sounds like they told her she had just chronic constipation.  She thinks she had of Crohn's flare at that time but they did not treat her with anything she was on Lialda at that time and she says they just told her to continue that.  Since January she has had rectal bleeding.  Is pretty significant at first.  She had been off the Lialda so she restarted it and she said that can a decrease the bleeding.  But then she started having joint pain so she stopped it.  Currently still having frequent rectal bleeding.  She suffers from chronic constipation but on her current regimen of magnesium and milk of magnesium she is actually having daily bowel movements.  Does not have diarrhea.  Does not have abdominal pain.  Does not have weight loss.  Denies family history of GI malignancies.  She does have acid reflux but is concerned to be on chronic omeprazole as she has osteoporosis.  Recent labs to include CBC and iron were normal   we proceeded with colonoscopy and CT enterography: colonoscopy was normal except for patchy erythema in rectum and Dc (in DC  may have been  due to diverticular colitis)segmental  colonic biopsies were normal.  She only had some rectal chronic inflammation on rectal biopsies.  CTE in was normal.  We did put her on Canasa do the chronic inflammation on the rectal biopsies. Then had follow-up: She is been on daily Canasa for over a month.  She says she still getting daily rectal bleeding.  She will have a bowel movement in the morning or 2 and these are normal but in the afternoon she will have some blood in her stool or stools also fairly liquid.  She has had negative reactions to almost all oral mesalamine products to include sulfasalazine and Lialda.  She feels like the rectal bleeding is getting better on the Canasa but she still having it. At last visit she really just had proctitis on colonoscopy.  We put her on a trial of 1 month of budesonide enemas.  We did talk about being on oral mesalamine products which she felt like this caused joint issues but if we do we can try sulfasalazine we also told her to take Benefiber twice a day.  We switched her from Prilosec to Pepcid  She then had follow-up:  She said she started having daily heavy rectal bleeding since August.  She went up to [place] and was seeing a doctor there and they put her on oral budesonide and this has not changed anything.  She was on that for 2 months.  She is also had a change in bowel habits.  She is usually constipated and she does take magnesium.  But she feels that she is having several loose stools in the morning about 4 this is different than before.  Again started in August.  She is actually been gaining weight.  Recent labs in November showed a ferritin of 15.  CMP was normal.  Most recent hemoglobin was 11.7 She also has a lot of kind of phlegm in the back of her throat and she is seeing ENT next week.  She tried to take oral iron but that caused a lot of GI upset as well worsening constipation.  She is also been on the Canasa which helps somewhat but has not really resolved her rectal and she had joint pains when she took both mesalamine and Lialda.  When she was on budesonide enemas in the past that did help for several months.  She has been treated with prednisone in the past and she said this did help her symptoms  We proceeded with colonoscopy which showed 25 cm of contingous erythematous mucosa  Biopsy showed up as chronic inflammation.  We tried her back on sulfasalazine and she said she had an immediate reaction.  She also said she was constipated and wanted to try Linzess.  The one thing that is helped her in the past is not oral budesonide but topical budesonide.  Fecal Daniele protectant was 925.  We put her on a trial of prednisone.   She then had follow-up..  She said the prednisone at 40 made her shaky and not feeling well so she went down to 20.  She was only on 40 for a be a week.  She also is taking Linzess 290.  She was having a problem with constipation.  She will actually went down to prednisone 20 and she had a bowel movement today.  She says she is unable to tolerate any mesalamine products including the sulfasalazine.  And she had side effects with the 40 of prednisone.  Also they did not give her iron at [facility] [place] [facility] as she is had anaphylaxis in the past to meds and she just on oral iron  We tried her on oral budesonide and she said she had side effects from this and could only tolerate 1 pill a day instead of 9 mg a day and this is what she is been treated with before.  She also says she cannot tolerate prednisone more than 20 mg a day.  She then had follow-up:.  She went back on the prednisone but is been taking 5 mg.  She was on 40 mg for 10 days then 20 mg for a few days and now is been on 5 mg.  She took Canasa for one month.  She says she is back to taking her daily milk of magnesia which is her chronic constipation regiment.  On that she is having no bleeding having one daily bowel movement a day. We talked about at last visit I do not know if she has UC or Crohn's she did have ileitis initial colonoscopy but for the most part has had proctitis.  On last colonoscopy she had a 5 cm of inflammation in her fecal Daniele protectant was very elevated.  She states she is unable to take any mesalamine products other than Canasa and she is unable to do prednisone of more than 5 mg and budesonide of more than 3 mg due to side effect.  Her last fecal Daniele protectant was 158 which is still elevated but grossly improved since last 1 1 which was 925 in November 26.  We did talk about putting her back on budesonide enemas that she could tolerate this in the past and I think would be sufficient for her extent of disease.  But as she was asymptomatic and has had a multitude of side effects we decided to hold off.  Of note we received labs on January 7 and her ferritin was 9 which is pretty much unchanged  She is here for follow-up today.  She saw him on who put her on an increased dose of oral iron 2 pills a day.  She said she is back to her baseline of constipation.  She stopped the Linzess and takes magnesium citrate her magnesium and on this has regular bowel movements.  At prior visits she was still having rectal bleeding's we put her on budesonide enemas.  As she was continued to have problems with rectal bleeding despite this we started Stelara.  We have recent labs which showed a ferritin of 9 and iron sat of 23.  Ferritin before this was 12.  Fecal Daniele protectant in January 19 was 158 this was after a steroid trial in December.  CBC and CMP were normal.  She then started on Stelara.  And last fecal Daniele protectant was 241 on April 14   She is here for follow-up today.  She had her Stelara induction on March 10.  Since that time she is a great improvement on her rectal bleeding she still having it once a month.  But that is much better than before.  She is back to being constipated and she has to take max it every day and on that she has a bowel movement every day.  Her only complaint is fatigue.  She was recently started on one oral iron pill a day.  She cannot have infusions as she stated she had a severe reaction to this. she was having some ear and dizziness issues in a CT scan which showed acute sinusitis.  At last visit she has had progression in her disease that she just had proctitis before and when I scoped her due to rectal bleeding she had left-sided disease up to 25 cm and her fecal calprotectin was 1 thousand.  We try to put her back on a mesalamine products but she had an immediate reaction.  We also tried her on prednisone and she had a reaction.  We also tried budesonide and she had a reaction.  She could do budesonide enemas.  Due to continued rectal bleeding we started her on Stelara on March 10.  We did tell her do a fecal calprotectin in 3 [place]  She is now calling as she has had constipation actually  We have not seen her since April 2022.  She has been getting her Stelara and she says last time she got it she had a  reaction and the infusion nurse told her to get an EpiPen. (this was mainly due to her many allergic reactions that she has)  She also told her to check levels. Her stelarra level was 7.8 and zero antibodies. Fecal calpro was 32.  Her last fecal calprotectin before was on 7/20/2022 which was in the low 70s Last colonoscopy was in [place] 9/2022 and had active mucosal protcititis and inflammation on rectal bxs. Normal recent labs  She has been on Stelara since about April.  She is asymptomatic on this but she says the last 2 time she is got an injection she feels fullness in the back of her throat.  She is not feeling anaphylaxis because she has had this before but this is kind of fullness

## 2023-04-04 ENCOUNTER — TELEPHONE ENCOUNTER (OUTPATIENT)
Dept: URBAN - METROPOLITAN AREA CLINIC 9 | Facility: CLINIC | Age: 63
End: 2023-04-04

## 2023-04-05 ENCOUNTER — WEB ENCOUNTER (OUTPATIENT)
Dept: URBAN - METROPOLITAN AREA CLINIC 9 | Facility: CLINIC | Age: 63
End: 2023-04-05

## 2023-04-06 ENCOUNTER — WEB ENCOUNTER (OUTPATIENT)
Dept: URBAN - METROPOLITAN AREA CLINIC 9 | Facility: CLINIC | Age: 63
End: 2023-04-06

## 2023-04-14 LAB
% SATURATION: 27
FERRITIN: 15
HEPATITIS A AB, TOTAL: (no result)
HEPATITIS B CORE AB TOTAL: (no result)
HEPATITIS B SURFACE AB IMMUNITY, QN: <5
HEPATITIS B SURFACE ANTIGEN: (no result)
IRON BINDING CAPACITY: 354
IRON, TOTAL: 94
MITOGEN-NIL: 3.53
QUANTIFERON NIL VALUE: 0.01
QUANTIFERON TB1 AG VALUE: 0.01
QUANTIFERON TB2 AG VALUE: 0.01
QUANTIFERON-TB GOLD PLUS: NEGATIVE
VARICELLA ZOSTER VIRUS: >4000
VITAMIN D,25-OH,TOTAL,IA: 41

## 2023-04-18 ENCOUNTER — WEB ENCOUNTER (OUTPATIENT)
Dept: URBAN - METROPOLITAN AREA CLINIC 9 | Facility: CLINIC | Age: 63
End: 2023-04-18

## 2023-05-01 ENCOUNTER — LAB OUTSIDE AN ENCOUNTER (OUTPATIENT)
Dept: URBAN - METROPOLITAN AREA CLINIC 9 | Facility: CLINIC | Age: 63
End: 2023-05-01

## 2023-05-12 ENCOUNTER — WEB ENCOUNTER (OUTPATIENT)
Dept: URBAN - METROPOLITAN AREA CLINIC 9 | Facility: CLINIC | Age: 63
End: 2023-05-12

## 2023-05-31 ENCOUNTER — OFFICE VISIT (OUTPATIENT)
Dept: PULMONOLOGY | Facility: CLINIC | Age: 63
End: 2023-05-31
Payer: COMMERCIAL

## 2023-05-31 VITALS
BODY MASS INDEX: 28.39 KG/M2 | DIASTOLIC BLOOD PRESSURE: 76 MMHG | WEIGHT: 168 LBS | SYSTOLIC BLOOD PRESSURE: 134 MMHG | OXYGEN SATURATION: 98 % | TEMPERATURE: 97.9 F | HEART RATE: 80 BPM

## 2023-05-31 DIAGNOSIS — R91.8 PULMONARY NODULES: ICD-10-CM

## 2023-05-31 DIAGNOSIS — J45.41 MODERATE PERSISTENT ASTHMA WITH EXACERBATION: Primary | ICD-10-CM

## 2023-05-31 DIAGNOSIS — J45.40 MODERATE PERSISTENT ASTHMA, UNCOMPLICATED: ICD-10-CM

## 2023-05-31 PROCEDURE — 99214 OFFICE O/P EST MOD 30 MIN: CPT | Performed by: NURSE PRACTITIONER

## 2023-05-31 RX ORDER — ROSUVASTATIN CALCIUM 5 MG/1
5 TABLET, COATED ORAL DAILY
COMMUNITY

## 2023-05-31 RX ORDER — MOMETASONE FUROATE 200 UG/1
2 AEROSOL RESPIRATORY (INHALATION) 2 TIMES DAILY
Qty: 13 G | Refills: 11 | Status: SHIPPED | OUTPATIENT
Start: 2023-05-31 | End: 2024-03-22

## 2023-05-31 RX ORDER — BIOTIN 1 MG
1000 TABLET ORAL DAILY
COMMUNITY

## 2023-05-31 RX ORDER — PREDNISONE 10 MG/1
TABLET ORAL
Qty: 9 TABLET | Refills: 0 | Status: SHIPPED | OUTPATIENT
Start: 2023-05-31 | End: 2023-06-06

## 2023-05-31 NOTE — PROGRESS NOTES
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    Moderate persistent asthma, with exacerbation  Pulmonary nodules  Her history, imaging, and pulmonary function testing suggest possible asthma, with mosaic attenuation, chronic sputum production, significant atopic history, and history of wheezing.   Multiple environmental allergies; respiratory allergen-specific IgE panel in Augusts 2022 showed mild elevation to most trees, weeds, dust mites.    She has tiny nodules that have been stable over time, and quit smoking in 1998 so does not qualify for annual low-dose chest CT.   Last visit increased ICS dose.  Previously did not tolerate ICS/LABA (Breo Ellipta).  Today she presents with symptoms of exacerbation since returning to [place].  Plan:  - start prednisone 20mg x3 days, 10mg x3 days (had multiple SEs from higher doses of prednisone)  - continue Asmanex (mometasone) 200 mcg, two inhalations BID; rinse/gargle/spit water after use  - continue fexofenadine 180 mg daily   - continue albuterol HFA as needed  - guaifenesin OTC up to 1200 mg BID as needed  - ongoing exercise recommended  - no indication for further chest CTs with tiny nodules stable for years; does not qualify for annual low-dose chest CT screening  - UTD with COVID vaccines, due for bivalent booster.  Recommend annual influenza vaccination and pneumococcal vaccination, though she is reluctant due to potential side effects.     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    Since last visit (10/2022, Dr Pan), breathing has been stable.  She is in [place] from November - May.  Since back from [place], she has noticed increase in cough and mucous d/t allergies, season changes, poor air quality.  Continues on Asmanex twice daily, rinses mouth.  Rare albuterol use.  Used mucinex previously daily, was finding benefit from this, but has recently quit, and notices more mucous.  No recent exacerbations, has not required prednisone.  She previously did not tolerate dosages larger than 20mg.  Seeing allergist at [place] tomorrow, long hx of allergies and requiring allergy shots.          10/26/2022    11:00 AM   ACT Total Scores   ACT TOTAL SCORE (Goal Greater than or Equal to 20) 24   In the past 12 months, how many times did you visit the emergency room for your asthma without being admitted to the hospital? 0   In the past 12 months, how many times were you hospitalized overnight because of your asthma? 0          ROS:     10-point ROS performed and is negative aside from those listed in HPI.       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    Allergies:  Allergies   Allergen  Reactions     Haemophilus B Polysaccharide Vaccine Anaphylaxis     Influenza Virus Vaccine Anaphylaxis     Activated Charcoal Headache     Allergy Multi-Symptom Night [Diphenhydramine-Pe-Apap] Headache     Fexofenadine Other (See Comments)     sleepy     Melatonin Headache     Mesalamine Headache and Other (See Comments)     Prednisone Other (See Comments)     Feel weird      Sulfasalazine Headache     Triamcinolone Headache     Erythromycin Headache and Nausea and Vomiting       Family HX:  Family History   Problem Relation Age of Onset     Hereditary Breast and Ovarian Cancer Syndrome Mother      Breast Cancer Mother 43.00     Ovarian Cancer Mother 69.00     Hereditary Breast and Ovarian Cancer Syndrome Maternal Grandmother      Ovarian Cancer Maternal Grandmother        Social Hx:  Social History     Socioeconomic History     Marital status:      Spouse name: Not on file     Number of children: Not on file     Years of education: Not on file     Highest education level: Not on file   Occupational History     Not on file   Tobacco Use     Smoking status: Former     Types: Cigarettes     Smokeless tobacco: Never   Vaping Use     Vaping status: Never Used   Substance and Sexual Activity     Alcohol use: Not on file     Drug use: Not on file     Sexual activity: Not on file   Other Topics Concern     Not on file   Social History Narrative     Not on file     Social Determinants of Health     Financial Resource Strain: Not on file   Food Insecurity: Not on file   Transportation Needs: Not on file   Physical Activity: Not on file   Stress: Not on file   Social Connections: Not on file   Intimate Partner Violence: Not on file   Housing Stability: Not on file       Current Meds:  Current Outpatient Medications   Medication Sig Dispense Refill     albuterol (PROAIR HFA/PROVENTIL HFA/VENTOLIN HFA) 108 (90 Base) MCG/ACT inhaler Inhale 2 puffs into the lungs every 6 hours as needed for shortness of breath / dyspnea or  wheezing       alendronate (FOSAMAX) 70 MG tablet Take 70 mg by mouth every 7 days       Ascorbic Acid (VITAMIN C PO) Take 250 mg by mouth daily With iron 65 mg 1x per day       biotin 1000 MCG TABS tablet Take 1,000 mcg by mouth daily       Calcium Carb-Cholecalciferol 600-800 MG-UNIT TABS Take 1 tablet by mouth daily       fexofenadine (ALLEGRA) 180 MG tablet Take by mouth every 24 hours       magnesium hydroxide (MOM) 2400 MG/10ML SUSP Take 5 mLs by mouth daily as needed for constipation       mometasone furoate (ASMANEX HFA) 200 MCG/ACT inhaler Inhale 2 puffs into the lungs 2 times daily 13 g 11     NONFORMULARY Skyrizi-every 2 months       olopatadine (PATANOL) 0.1 % ophthalmic solution Place 1 drop into both eyes as needed for allergies       omeprazole (PRILOSEC) 20 MG DR capsule Take 20 mg by mouth daily       predniSONE (DELTASONE) 10 MG tablet Take 2 tablets (20 mg) by mouth daily for 3 days, THEN 1 tablet (10 mg) daily for 3 days. 9 tablet 0     QVAR REDIHALER 80 MCG/ACT inhaler Inhale 1 puff into the lungs 2 times daily       rosuvastatin (CRESTOR) 10 MG tablet Take 10 mg by mouth daily       traZODone (DESYREL) 50 MG tablet Take 50 mg by mouth At Bedtime       vitamin B-12 (CYANOCOBALAMIN) 500 MCG tablet Take 500 mcg by mouth daily          Physical Exam:     /76 (BP Location: Left arm, Patient Position: Chair, Cuff Size: Adult Regular)   Pulse 80   Temp 97.9  F (36.6  C) (Oral)   Wt 76.2 kg (168 lb)   SpO2 98%   BMI 28.39 kg/m    Gen: adult female , appears in NAD  HEENT: clear conjunctivae, moist mucous membranes  CV: RRR, no M/G/R  Resp: CTAB, no focal crackles or wheezes.  Respirations even and unlabored.  On RA. No cough.  Skin: no apparent rashes on visible skin  Ext: no cyanosis, clubbing or edema  Neuro: alert and answering questions appropriately       Data:         Imaging studies:    Chest CT (September 2022):                                         IMPRESSION:   1.  Mild mosaic  attenuation the lungs suggestive of air trapping and/or small airways disease.  2.  Mild bronchial wall thickening.  3.  Scattered pulmonary micronodules measuring up to 3 mm. Follow-up guidelines as below.  AW addendum: Multiple tiny nodules appear stable compared to chest CTs from [place] over many years    Pulmonary Function Testing  October 2022

## 2023-05-31 NOTE — PATIENT INSTRUCTIONS
It was a pleasure to see you in clinic today.   Here is what we discussed:    Start prednisone 20mg x3 days, then 10mg x3 days.  Continue Asmanex two inhalations twice daily, rinse/gargle after use.  Continue Albuterol as needed  Continue Allegra and nasal sprays  Call us with any change or worsening of your breathing.  Follow-up in one year    Coby Alfonso CNP  Pulmonary Medicine  [facility]  [phone]

## 2023-06-01 ENCOUNTER — WEB ENCOUNTER (OUTPATIENT)
Dept: URBAN - METROPOLITAN AREA CLINIC 9 | Facility: CLINIC | Age: 63
End: 2023-06-01

## 2023-06-14 ENCOUNTER — TELEPHONE (OUTPATIENT)
Dept: PULMONOLOGY | Facility: CLINIC | Age: 63
End: 2023-06-14
Payer: COMMERCIAL

## 2023-06-14 DIAGNOSIS — J45.41 MODERATE PERSISTENT ASTHMA WITH EXACERBATION: Primary | ICD-10-CM

## 2023-06-14 RX ORDER — PREDNISONE 10 MG/1
TABLET ORAL
Qty: 9 TABLET | Refills: 0 | Status: SHIPPED | OUTPATIENT
Start: 2023-06-14 | End: 2023-06-20

## 2023-06-14 RX ORDER — DOXYCYCLINE 100 MG/1
100 CAPSULE ORAL 2 TIMES DAILY
Qty: 10 CAPSULE | Refills: 0 | Status: SHIPPED | OUTPATIENT
Start: 2023-06-14 | End: 2023-06-19

## 2023-06-14 NOTE — TELEPHONE ENCOUNTER
Celena called today in regards to having a sore throat, congestion, yellow colored phlegm and headache. Denies fevers.She has tested herself for covid which was negative. Also, she just went to the dentist and had many cavities. Dentist told her it could be due to the Qvar. Requesting recommendations/orders from Coby Alfonso CNP.

## 2023-06-14 NOTE — TELEPHONE ENCOUNTER
Per Coby Alfonso CNP:  We can repeat the prednisone course and add a z-pack, I will send these prescriptions now.  Can you call and tell her, also let her know if no improvement she will need to be seen in clinic again.     Called and informed Celena of new orders. Also, Z-pack switched to doxycycline due to allergy list. She will follow up on 6/26 with regards to the Qvar.

## 2023-06-15 ENCOUNTER — TELEPHONE (OUTPATIENT)
Dept: PULMONOLOGY | Facility: CLINIC | Age: 63
End: 2023-06-15
Payer: COMMERCIAL

## 2023-06-15 NOTE — TELEPHONE ENCOUNTER
"Phone call from patient.  Started the doxycycline last night and did ok.  Took again this morning with the prednisone on an empty stomach and had severe stomach pains and \"bloating\".        Reviewed with Coby Alfonso NP in clinic and will suggest that she can continue the antibiotic and prednisone, but take with some food and/or probiotic or yogurt.   She can try this, if still having stomach issues, she can take just the prednisone (without an antibiotic).  If not doing well with just the prednisone, she can scheduled follow up visit sooner to be evaluated.   Spoke with Celena and she is good with this plan.    Is hoping to feel better by Monday as she is scheduled with GI to have an infusion for her Crohn's disease.       "

## 2023-06-23 ENCOUNTER — LAB REQUISITION (OUTPATIENT)
Dept: LAB | Facility: CLINIC | Age: 63
End: 2023-06-23

## 2023-06-23 DIAGNOSIS — D50.9 IRON DEFICIENCY ANEMIA, UNSPECIFIED: ICD-10-CM

## 2023-06-23 DIAGNOSIS — R68.2 DRY MOUTH, UNSPECIFIED: ICD-10-CM

## 2023-06-23 PROCEDURE — 82728 ASSAY OF FERRITIN: CPT | Performed by: PHYSICIAN ASSISTANT

## 2023-06-23 PROCEDURE — 86235 NUCLEAR ANTIGEN ANTIBODY: CPT | Performed by: PHYSICIAN ASSISTANT

## 2023-06-23 PROCEDURE — 83550 IRON BINDING TEST: CPT | Performed by: PHYSICIAN ASSISTANT

## 2023-06-24 LAB
FERRITIN SERPL-MCNC: 75 NG/ML (ref 11–328)
IRON BINDING CAPACITY (ROCHE): 318 UG/DL (ref 240–430)
IRON SATN MFR SERPL: 31 % (ref 15–46)
IRON SERPL-MCNC: 98 UG/DL (ref 37–145)

## 2023-06-26 LAB
ENA SS-B IGG SER IA-ACNC: <0.6 U/ML
ENA SS-B IGG SER IA-ACNC: NEGATIVE

## 2023-08-14 ENCOUNTER — TELEPHONE (OUTPATIENT)
Dept: PULMONOLOGY | Facility: CLINIC | Age: 63
End: 2023-08-14
Payer: COMMERCIAL

## 2023-08-14 DIAGNOSIS — J45.41 MODERATE PERSISTENT ASTHMA WITH EXACERBATION: Primary | ICD-10-CM

## 2023-08-14 RX ORDER — PREDNISONE 10 MG/1
TABLET ORAL
Qty: 9 TABLET | Refills: 0 | Status: SHIPPED | OUTPATIENT
Start: 2023-08-14 | End: 2024-01-26

## 2023-08-14 NOTE — TELEPHONE ENCOUNTER
Phone call from patient.  Has been more prone to colds/virus since starting Skyrizzi and asking if she could have antibiotic and prednisone, possibly something for COVID to take with her on future vacation in case it is needed.  She is going on a cruise in [place].    Reviewed with Coby Alfonso NP in clinic:   Coby Alfonso NP Hoops, Dawn, RN  Is she having symptoms right now?  Or she just wants to be prepared?  If no symptoms, just send her some prednisone to take with just in case.  Thanks.      Patient is NOT having symptoms right now.  Just asking in case she develops something while out of the country.  Explained that Paxlovid cannot be prescribed unless + COVID testing.   Will send Rx for prednisone that she can take with her per Coby Alfonso NP.

## 2023-09-14 ENCOUNTER — OFFICE VISIT (OUTPATIENT)
Dept: PHARMACY | Facility: PHYSICIAN GROUP | Age: 63
End: 2023-09-14
Payer: COMMERCIAL

## 2023-09-14 DIAGNOSIS — J45.30 MILD PERSISTENT ASTHMA WITHOUT COMPLICATION: Primary | ICD-10-CM

## 2023-09-14 DIAGNOSIS — Z78.9 TAKES DIETARY SUPPLEMENTS: ICD-10-CM

## 2023-09-14 DIAGNOSIS — K50.919 CROHN'S DISEASE WITH COMPLICATION, UNSPECIFIED GASTROINTESTINAL TRACT LOCATION (H): ICD-10-CM

## 2023-09-14 DIAGNOSIS — K21.9 GASTROESOPHAGEAL REFLUX DISEASE, UNSPECIFIED WHETHER ESOPHAGITIS PRESENT: ICD-10-CM

## 2023-09-14 DIAGNOSIS — M81.0 AGE RELATED OSTEOPOROSIS, UNSPECIFIED PATHOLOGICAL FRACTURE PRESENCE: ICD-10-CM

## 2023-09-14 DIAGNOSIS — J30.9 ALLERGIC RHINITIS, UNSPECIFIED SEASONALITY, UNSPECIFIED TRIGGER: ICD-10-CM

## 2023-09-14 DIAGNOSIS — K59.00 CONSTIPATION, UNSPECIFIED CONSTIPATION TYPE: ICD-10-CM

## 2023-09-14 DIAGNOSIS — K51.919 ULCERATIVE COLITIS WITH COMPLICATION, UNSPECIFIED LOCATION (H): ICD-10-CM

## 2023-09-14 DIAGNOSIS — E78.2 MIXED HYPERLIPIDEMIA: ICD-10-CM

## 2023-09-14 PROCEDURE — 99605 MTMS BY PHARM NP 15 MIN: CPT | Performed by: PHARMACIST

## 2023-09-14 PROCEDURE — 99607 MTMS BY PHARM ADDL 15 MIN: CPT | Performed by: PHARMACIST

## 2023-09-14 NOTE — PROGRESS NOTES
Medication Therapy Management (MTM) Encounter    ASSESSMENT:                            Medication Adherence/Access:   No issues identified. She does receive care between [place] and [place] with multiple prescribers.     Asthma:   Reported incidence of upper respiratory infections with inhaled mometasone is 3-17% and reported incidence of nasopharyngitis is 1.2-8%. Given her mouth symptoms she may benefit from trial switching to dry-powder inhaler rather than aerosol, she as equivalent dose dry-powder version of mometasone at home she could try. May also consider stepping down dose of steroid inhaler or switching to lower potency agent in the future. Unclear if montelukast she was using in the past was also helping allergy and upper respiratory symptoms, if she does not see improvement with stopping Skyrizi and other measures below may consider restarting this.     Allergic Rhinitis:   Symptoms not controlled. Too soon to determine if symptoms will improve now that she is off Skyrizi. Noted after visit she is filling the Qvar Redihaler breath-actuated device to use nasal, further clarification with patient needed to determine if she is able to get the dose of this with how she is administering.     Hyperlipidemia   Last lipid labs was taken off statin therapy. No lipid profile since resuming rosuvastatin, due to recheck at next visit.     Osteoporosis:   Stable. Recommended patient notify dental provider she is on bisphosphonate therapy before dental work.     Crohn's Disease, Ulcerative colitis:    More time needed to determine if upper respiratory symptoms improve off Skyrizi. Follow-up plan in place with GI specialist.     GERD:   Omeprazole reported incidence of upper respiratory infections is 2%. If symptoms do not improve when further out from stopping Skyrizi, may consider trying to switch to alternative non-PPI therapy.     Constipation:  Stable per patient.     Insomnia: Stable.     Supplements:   Suggested   calcium doses as noted above. No interactions or issues with other medications or conditions.     PLAN:                            Try using the Asthmanex twisthaler powder inhaler 1 puff once a day in the evening.   Using the powder inhaler instead of the aerosol inhaler may help.   High doses of steroid inhalers can also increase risk of upper respiratory infections and nasal symptoms.   In the future may consider switching to lower potency once a day powder steroid inhaler called Arnuity Ellipta.     Need to clarify how you are using QVar inhaler nasally. It appears the device you have been getting is a breath-actuated device.     May try testing Mucinex by stopping to see if it was helping or not    If nasal congestion symptoms do not improve after you are off Skyrizi and with above, may consider restarting montelukast (Singulair).    Our body only absorbs up to 600 mg of calcium at a time. Make sure to split up your calcium doses to get the best absorption.     Follow-up: by MyChart as needed     SUBJECTIVE/OBJECTIVE:                          Celena Nevarez is a 63 year old female coming in for an initial visit. She was referred to me from Anegline Stewart PA-C.      Reason for visit: Medication review.    Allergies/ADRs: Reviewed in chart  Past Medical History: Reviewed in chart  Tobacco: She reports that she has quit smoking. Her smoking use included cigarettes. She has never used smokeless tobacco.  Alcohol: none    Medication Adherence/Access:   No issues reported. She has providers in [place] and [place] prescribing medications. She is going on a cruise soon.     Asthma:   ICS - Asmanex  mcg/act 2 puffs twice a day- pharmacy accidentally filled the Twisthaler in error so she has this at home but has always used the aerosol inhaler.  Albuterol inhaler as needed- doesn't use   Patient rinses their mouth after using steroid inhaler. Her dose of inhaler was increased by a provider in [place], she  isn't sure why. She did receive a different powder inhaler Asthmanex on accident from pharmacy so she does have one of these at home.  She had been on Breo in the past which caused her to feel jittery.  Patient reports the following medication side effects: nasal congestion, upper respiratory issues.      Triggers include: allergies.  Patient reports the following symptoms: no lower respiratory symptoms or issues with shortness of breath.    Allergic Rhinitis:   fexofenadine 180 mg once daily  Epinastine 0.05% 1 drop each eye once daily  Qvar (beclomethasone) adapted to nasal spray - 2 spray(s) each nostril twice daily  It appears the QVAR redihaler device is being filled when looking at pharmacy refill history, unclear if this is adaptable to use nasally. She had been using fluticasone nasal spray for many years before it was switched to this by provider in [place]. She uses baby bottle nipples to administer.   Patient reports the following medication side effects: none.    Primary symptoms are nasal congestion, runny nose, and cough.   Patient feels that current therapy is somewhat effective. Tried stopping Allegra and had more issues and noticed a difference when she restarted it.     Hyperlipidemia   rosuvastatin 5mg daily  Patient reports no significant myalgias or other side effects. She tried stopping this but cholesterol labs went up so started back at lower dose.   The 10-year ASCVD risk score (Gerry JERONIMO, et al., 2019) is: 5.2%    Values used to calculate the score:      Age: 63 years      Sex: Female      Is Non- : No      Diabetic: No      Tobacco smoker: No      Systolic Blood Pressure: 132 mmHg      Is BP treated: No      HDL Cholesterol: 58 mg/dL      Total Cholesterol: 318 mg/dL       Recent Labs   Lab Test 11/02/22  0918   CHOL 318*   HDL 58   *   TRIG 197*       Osteoporosis:   alendronate (Fosamax) 70 mg weekly (has been on current therapy a few years)  Calcium/vitamin  D/Mg 750 mg- 3 chews daily   Vitamin D-3 125 mcg once daily  Dental work with root canal and cavity fillings.   Patient is not experiencing side effects.  DEXA History: 2/2021  Risk factors: post-menopausal  Last vitamin D level:  Lab Results   Component Value Date    VITDT 69 11/02/2022    VITDT 46 10/29/2021       Crohn's Disease, Ulcerative colitis:    Skyrizi stopped. Concerned this may be contributing to her ongoing upper respiratory issues. Follows with GI provider at [place] in [place]. Just stopped this so too soon to know if symptoms will improve now that she is off it. She has not had any symptom flare ups. She was given prescription for mesalamine suppositories and prednisone in case she has symptom flare up during upcoming cruise.       GERD:   Omeprazole 20 mg once daily   Patient reports no current symptoms.  Patient feels that current regimen is effective. She has tried stopping this in the past and had much worse symptoms which improved when she restarted it.     Constipation:    Milk of magnesia 1200 mg daily at night   This keeps her having regular bowel movements. Has struggled with constipation without this.     Insomnia:   Trazodone 50 mg at bedtime  Helps with sleep. No next day grogginess or other side effects.     Supplements:   Vitron C 1 tablet daily  Vitamin C 1000 mg twice a day   Calcium/vitamin D/Mg 750 3 chews daily   Vitamin D-3 125 mcg once daily  Vitamin B-12 1 tablet daily  No reported issues at this time.       Today's Vitals: /86 (BP Location: Right arm, Patient Position: Sitting, Cuff Size: Adult Regular)   Pulse 87   ----------------      I spent 60 minutes with this patient today. All changes were made via collaborative practice agreement with Angeline Stewart PA-C. A copy of the visit note was provided to the patient's provider(s).    A summary of these recommendations was sent via [employer].    Suha Fountain, PharmD, BCACP  Medication Therapy Management Pharmacist  [facility]  Pager: [phone]     Medication Therapy Recommendations  Age related osteoporosis, unspecified pathological fracture presence    Current Medication: calcium carbonate 750 MG CHEW   Rationale: Incorrect administration - Dosage too low - Effectiveness   Recommendation: Increase Frequency   Status: Accepted - no CPA Needed         Mild persistent asthma without complication    Current Medication: mometasone furoate (ASMANEX HFA) 200 MCG/ACT inhaler   Rationale: Undesirable effect - Adverse medication event - Safety   Recommendation: Change Medication - Asmanex (120 Metered Doses) 220 MCG/ACT Aepb   Status: Accepted per CPA

## 2023-09-19 VITALS — DIASTOLIC BLOOD PRESSURE: 86 MMHG | SYSTOLIC BLOOD PRESSURE: 132 MMHG | HEART RATE: 87 BPM

## 2023-09-19 RX ORDER — GUAIFENESIN 600 MG/1
1200 TABLET, EXTENDED RELEASE ORAL 2 TIMES DAILY PRN
COMMUNITY

## 2023-09-19 RX ORDER — EPINEPHRINE 0.3 MG/.3ML
0.3 INJECTION SUBCUTANEOUS PRN
COMMUNITY

## 2023-09-19 RX ORDER — CALCIUM CARBONATE 750 MG/1
750 TABLET, CHEWABLE ORAL DAILY
COMMUNITY

## 2023-09-19 RX ORDER — EPINASTINE HCL 0.05 %
1 DROPS OPHTHALMIC (EYE) 2 TIMES DAILY
COMMUNITY

## 2023-09-19 NOTE — PATIENT INSTRUCTIONS
"Recommendations from today's MTM visit:                                                    MTM (medication therapy management) is a service provided by a clinical pharmacist designed to help you get the most of out of your medicines.   Today we reviewed what your medicines are for, how to know if they are working, that your medicines are safe and how to make your medicine regimen as easy as possible.      Try using the Asthmanex twisthaler powder inhaler 1 puff once a day in the evening.   Using the powder inhaler instead of the aerosol inhaler may help.   High doses of steroid inhalers can also increase risk of upper respiratory infections and nasal symptoms.   In the future may consider switching to lower potency once a day powder steroid inhaler called Arnuity Ellipta.     Need to clarify how you are using QVar inhaler nasally. It appears the device you have been getting is a breath-actuated device.     May try testing Mucinex by stopping to see if it was helping or not    If nasal congestion symptoms do not improve after you are off Skyrizi and with above, may consider restarting montelukast (Singulair).    Our body only absorbs up to 600 mg of calcium at a time. Make sure to split up your calcium doses to get the best absorption.     Follow-up: by MyChart as needed     It was great speaking with you today.  I value your experience and would be very thankful for your time in providing feedback in our clinic survey. In the next few days, you may receive an email or text message from [employer] with a link to a survey related to your  clinical pharmacist.\"     To schedule another MTM appointment, please call [phone].    My Clinical Pharmacist's contact information:                                                      Please feel free to contact me with any questions or concerns you have.      Suha Fountain, PharmD, BCACP  Medication Therapy Management Pharmacist  [facility]  Voicemail: " [phone]

## 2023-10-13 ENCOUNTER — LAB REQUISITION (OUTPATIENT)
Dept: LAB | Facility: CLINIC | Age: 63
End: 2023-10-13

## 2023-10-13 DIAGNOSIS — E78.2 MIXED HYPERLIPIDEMIA: ICD-10-CM

## 2023-10-13 LAB
CHOLEST SERPL-MCNC: 159 MG/DL
HDLC SERPL-MCNC: 55 MG/DL
LDLC SERPL CALC-MCNC: 73 MG/DL
NONHDLC SERPL-MCNC: 104 MG/DL
TRIGL SERPL-MCNC: 153 MG/DL

## 2023-10-13 PROCEDURE — 80061 LIPID PANEL: CPT | Performed by: PHYSICIAN ASSISTANT

## 2023-10-30 ENCOUNTER — MYC MEDICAL ADVICE (OUTPATIENT)
Dept: PULMONOLOGY | Facility: CLINIC | Age: 63
End: 2023-10-30
Payer: COMMERCIAL

## 2023-10-30 DIAGNOSIS — J45.41 MODERATE PERSISTENT ASTHMA WITH EXACERBATION: Primary | ICD-10-CM

## 2024-01-26 ENCOUNTER — TELEPHONE (OUTPATIENT)
Dept: PULMONOLOGY | Facility: CLINIC | Age: 64
End: 2024-01-26
Payer: COMMERCIAL

## 2024-01-26 DIAGNOSIS — J45.41 MODERATE PERSISTENT ASTHMA WITH EXACERBATION: ICD-10-CM

## 2024-01-26 RX ORDER — PREDNISONE 10 MG/1
TABLET ORAL
Qty: 9 TABLET | Refills: 0 | Status: SHIPPED | OUTPATIENT
Start: 2024-01-26 | End: 2024-06-11

## 2024-01-26 NOTE — TELEPHONE ENCOUNTER
"Phone call from patient.  States that she has started to cough again and is bringing up some \"chunks\" of phlegm.  Sometimes yellowish in color.  Feels tired and worn out.    Will send Rx for her action plan to her pharmacy in [place], [place] -- prednisone 20mg daily x 3 days, then 10mg daily x 3 days.  "

## 2024-03-04 DIAGNOSIS — J45.41 MODERATE PERSISTENT ASTHMA WITH EXACERBATION: ICD-10-CM

## 2024-03-22 DIAGNOSIS — J45.40 MODERATE PERSISTENT ASTHMA, UNCOMPLICATED: ICD-10-CM

## 2024-03-22 RX ORDER — MOMETASONE FUROATE 200 UG/1
2 AEROSOL RESPIRATORY (INHALATION) 2 TIMES DAILY
Qty: 13 G | Refills: 2 | Status: SHIPPED | OUTPATIENT
Start: 2024-03-22 | End: 2024-06-11 | Stop reason: ALTCHOICE

## 2024-03-30 ENCOUNTER — HEALTH MAINTENANCE LETTER (OUTPATIENT)
Age: 64
End: 2024-03-30

## 2024-06-06 DIAGNOSIS — J45.41 MODERATE PERSISTENT ASTHMA WITH EXACERBATION: ICD-10-CM

## 2024-06-06 ASSESSMENT — ASTHMA QUESTIONNAIRES
ACT_TOTALSCORE: 25
ACT_TOTALSCORE: 25
QUESTION_5 LAST FOUR WEEKS HOW WOULD YOU RATE YOUR ASTHMA CONTROL: COMPLETELY CONTROLLED
QUESTION_4 LAST FOUR WEEKS HOW OFTEN HAVE YOU USED YOUR RESCUE INHALER OR NEBULIZER MEDICATION (SUCH AS ALBUTEROL): NOT AT ALL
QUESTION_1 LAST FOUR WEEKS HOW MUCH OF THE TIME DID YOUR ASTHMA KEEP YOU FROM GETTING AS MUCH DONE AT WORK, SCHOOL OR AT HOME: NONE OF THE TIME
QUESTION_3 LAST FOUR WEEKS HOW OFTEN DID YOUR ASTHMA SYMPTOMS (WHEEZING, COUGHING, SHORTNESS OF BREATH, CHEST TIGHTNESS OR PAIN) WAKE YOU UP AT NIGHT OR EARLIER THAN USUAL IN THE MORNING: NOT AT ALL
QUESTION_2 LAST FOUR WEEKS HOW OFTEN HAVE YOU HAD SHORTNESS OF BREATH: NOT AT ALL

## 2024-06-06 NOTE — TELEPHONE ENCOUNTER
Prescription sent for one refill to [place] per faxed request of the [facility]. Patient has scheduled follow up appt on 6/11/24.

## 2024-06-11 ENCOUNTER — OFFICE VISIT (OUTPATIENT)
Dept: PULMONOLOGY | Facility: CLINIC | Age: 64
End: 2024-06-11
Payer: COMMERCIAL

## 2024-06-11 VITALS
SYSTOLIC BLOOD PRESSURE: 130 MMHG | BODY MASS INDEX: 29.41 KG/M2 | HEART RATE: 80 BPM | OXYGEN SATURATION: 97 % | WEIGHT: 174 LBS | DIASTOLIC BLOOD PRESSURE: 74 MMHG

## 2024-06-11 DIAGNOSIS — J45.30 MILD PERSISTENT ASTHMA WITHOUT COMPLICATION: Primary | ICD-10-CM

## 2024-06-11 PROCEDURE — 99214 OFFICE O/P EST MOD 30 MIN: CPT | Performed by: NURSE PRACTITIONER

## 2024-06-11 RX ORDER — PREDNISONE 20 MG/1
20 TABLET ORAL DAILY
Qty: 7 TABLET | Refills: 0 | Status: SHIPPED | OUTPATIENT
Start: 2024-06-11 | End: 2024-06-18

## 2024-06-11 NOTE — PATIENT INSTRUCTIONS
It was a pleasure to see you in clinic today.   Here is what we discussed:    Continue Asmanex one puff twice daily, rinse/gargle after use.   Continue Albuterol every 4-6 hours as needed for shortness of breath or wheezing.  Call my nurse, Irvin ([phone]) with any change or worsening of your breathing.  Follow-up in one year.    Coby Alfonso CNP  Pulmonary Medicine  [facility]  [phone]

## 2024-06-11 NOTE — PROGRESS NOTES
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    Moderate persistent asthma  Pulmonary nodules  Environmental allergies  Her history, imaging, and pulmonary function testing suggest possible asthma, with mosaic attenuation, chronic sputum production, significant atopic history, and history of wheezing.   Lives in [place] fall-spring.  Multiple environmental allergies; respiratory allergen-specific IgE panel in Augusts 2022 showed mild elevation to most trees, weeds, dust mites.    She has tiny nodules that have been stable over time, and quit smoking in 1998 so does not qualify for annual low-dose chest CT.   Previously did not tolerate ICS/LABA (Breo Ellipta).  Over the past year she has been doing well on Asmanex and Allegra.  No exacerbations over past year.  ACT score 25 today.    Plan:  - continue Asmanex (mometasone) Twisthaler 220 mcg, one inhalation BID; rinse/gargle/spit water after use  - continue fexofenadine 180 mg daily   - continue albuterol HFA as needed  - UTD with COVID vaccines, due for bivalent booster.  Recommend annual influenza vaccination and pneumococcal vaccination, though she is reluctant due to potential side effects.   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    Since last visit (5/2023), she stopped Skyrizi (for Crohns) and multiple SEs improved.  Now on Asmanex twistihaler, enjoys this.  On allegra from allergist, can actually go outside.  Albuterol, never uses.   Last time needed prednisone was one year.   Went to [place] in fall, came back with URI, recovered well.            10/26/2022    11:00 AM 6/6/2024     2:19 PM   ACT Total Scores   ACT TOTAL SCORE (Goal Greater than or Equal to 20) 24 25   In the past 12 months, how many times did you visit the emergency room for your asthma without being admitted to the hospital? 0 0   In the past 12 months, how many times were you hospitalized overnight because of your asthma? 0 0          ROS:     10-point ROS performed and is negative aside from those listed in HPI.       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    Allergies:  Allergies   Allergen Reactions    Haemophilus B Polysaccharide Vaccine Anaphylaxis    Influenza Virus Vaccine Anaphylaxis    Activated Charcoal Headache    Allergy Multi-Symptom Night [Diphenhydramine-Pe-Apap] Headache    Duloxetine Other (See Comments)     Felt weird    Melatonin Headache    Mesalamine Headache and Other (See Comments)    Prednisone Other (See Comments)     Feel weird     Skyrizi [Risankizumab] Other  (See Comments)     Upper respiratory infection    Stelara [Ustekinumab (Murine)]      Fullness in throat    Sulfasalazine Headache    Triamcinolone Headache    Erythromycin Headache and Nausea and Vomiting       Family HX:  Family History   Problem Relation Age of Onset    Hereditary Breast and Ovarian Cancer Syndrome Mother     Breast Cancer Mother 43.00    Ovarian Cancer Mother 69.00    Hereditary Breast and Ovarian Cancer Syndrome Maternal Grandmother     Ovarian Cancer Maternal Grandmother        Social Hx:  Social History     Socioeconomic History    Marital status:      Spouse name: Not on file    Number of children: Not on file    Years of education: Not on file    Highest education level: Not on file   Occupational History    Not on file   Tobacco Use    Smoking status: Former     Types: Cigarettes    Smokeless tobacco: Never   Vaping Use    Vaping status: Never Used   Substance and Sexual Activity    Alcohol use: Not on file    Drug use: Not on file    Sexual activity: Not on file   Other Topics Concern    Not on file   Social History Narrative    Not on file     Social Determinants of Health     Financial Resource Strain: Low Risk  (5/25/2023)    Received from [facility]    Overall Financial Resource Strain (CARDIA)     Difficulty of Paying Living Expenses: Not hard at all   Food Insecurity: No Food Insecurity (5/25/2023)    Received from [facility]    Hunger Vital Sign     Worried About Running Out of Food in the Last Year: Never true     Ran Out of Food in the Last Year: Never true   Transportation Needs: No Transportation Needs (5/25/2023)    Received from [facility]    PRAPARE - Transportation     Lack of Transportation (Medical): No     Lack of Transportation (Non-Medical): No   Physical Activity: Sufficiently Active (5/25/2023)    Received from [facility]    Exercise Vital Sign     Days of Exercise per Week: 5 days     Minutes of Exercise  per Session: 30 min   Stress: No Stress Concern Present (5/25/2023)    [facility]    [employer] [place] [employer] - Occupational Stress Questionnaire     Feeling of Stress : Not at all   Social Connections: Unknown (5/25/2023)    [facility]    Social Connection and Isolation Panel [NHANES]     Frequency of Communication with Friends and Family: More than three times a week     Frequency of Social Gatherings with Friends and Family: More than three times a week     Attends Yarsanism Services: Patient declined     Active Member of Clubs or Organizations: No     Attends Club or Organization Meetings: Never     Marital Status:    Interpersonal Safety: Not At Risk (5/25/2023)    Received from [facility]    Humiliation, Afraid, Rape, and Kick questionnaire     Fear of Current or Ex-Partner: No     Emotionally Abused: No     Physically Abused: No     Sexually Abused: No   Housing Stability: Low Risk  (3/31/2022)    [facility]    Housing Stability Vital Sign     Unable to Pay for Housing in the Last Year: No     Number of Places Lived in the Last Year: 2     Unstable Housing in the Last Year: No       Current Meds:  Current Outpatient Medications   Medication Sig Dispense Refill    albuterol (PROAIR HFA/PROVENTIL HFA/VENTOLIN HFA) 108 (90 Base) MCG/ACT inhaler Inhale 2 puffs into the lungs every 6 hours as needed for shortness of breath / dyspnea or wheezing      alendronate (FOSAMAX) 70 MG tablet Take 70 mg by mouth every 7 days      biotin 1000 MCG TABS tablet Take 1,000 mcg by mouth daily      calcium carbonate 750 MG CHEW Take 750 mg by mouth daily (Calcium-vitamin D-magnesium)      epinastine HCl (ELESTAT) 0.05 % SOLN Place 1 drop into both eyes 2 times daily      EPINEPHrine (ANY BX GENERIC EQUIV) 0.3 MG/0.3ML injection 2-pack Inject 0.3 mg into the muscle as needed for anaphylaxis May repeat one time  in 5-15 minutes if response to initial dose is inadequate.      ferrous fumarate 65 mg, [place]. FE,-Vitamin C 125 mg (VITRON C)  MG TABS tablet Take 1 tablet by mouth daily      fexofenadine (ALLEGRA) 180 MG tablet Take by mouth every 24 hours      magnesium hydroxide (MOM) 2400 MG/10ML SUSP Take 5 mLs by mouth daily as needed for constipation      mometasone (ASMANEX TWISTHALER) 220 MCG/ACT inhaler Inhale 1 puff into the lungs 2 times daily 1 each 0    omeprazole (PRILOSEC) 20 MG DR capsule Take 20 mg by mouth daily      rosuvastatin (CRESTOR) 5 MG tablet Take 5 mg by mouth daily      traZODone (DESYREL) 50 MG tablet Take 50 mg by mouth At Bedtime      vitamin B-12 (CYANOCOBALAMIN) 500 MCG tablet Take 500 mcg by mouth daily      Vitamin D3 (CHOLECALCIFEROL) 125 MCG (5000 UT) tablet Take 125 mcg by mouth daily      Ascorbic Acid (VITAMIN C PO) Take 1,000 mg by mouth 2 times daily      guaiFENesin (MUCINEX) 600 MG 12 hr tablet Take 1,200 mg by mouth 2 times daily as needed for congestion      mometasone furoate (ASMANEX HFA) 200 MCG/ACT inhaler Inhale 2 puffs into the lungs 2 times daily 13 g 2    predniSONE (DELTASONE) 10 MG tablet Take 2 tabs daily x 3 days, THEN 1 tab daily x 3 days 9 tablet 0    QVAR REDIHALER 80 MCG/ACT inhaler Inhale 1 puff into the lungs 2 times daily (Nasal)          Physical Exam:     /74 (BP Location: Left arm, Patient Position: Chair, Cuff Size: Adult Regular)   Pulse 80   Wt 78.9 kg (174 lb)   SpO2 97%   BMI 29.41 kg/m    Gen: adult female, appears in NAD  HEENT: clear conjunctivae, moist mucous membranes  CV: RRR, no M/G/R  Resp: CTAB, no focal crackles or wheezes.  Respirations even and unlabored.  On RA. No cough.  Skin: no apparent rashes on visible skin  Ext: no cyanosis, clubbing or edema  Neuro: alert and answering questions appropriately       Data:         Imaging studies:    Chest CT (September 2022):                                         IMPRESSION:   1.  Mild  mosaic attenuation the lungs suggestive of air trapping and/or small airways disease.  2.  Mild bronchial wall thickening.  3.  Scattered pulmonary micronodules measuring up to 3 mm. Follow-up guidelines as below.  AW addendum: Multiple tiny nodules appear stable compared to chest CTs from [place] over many years    Pulmonary Function Testing  October 2022

## 2024-06-25 ENCOUNTER — TELEPHONE ENCOUNTER (OUTPATIENT)
Dept: URBAN - METROPOLITAN AREA CLINIC 23 | Facility: CLINIC | Age: 64
End: 2024-06-25

## 2024-10-09 ENCOUNTER — LAB REQUISITION (OUTPATIENT)
Dept: LAB | Facility: CLINIC | Age: 64
End: 2024-10-09

## 2024-10-09 DIAGNOSIS — E78.2 MIXED HYPERLIPIDEMIA: ICD-10-CM

## 2024-10-09 DIAGNOSIS — M81.0 AGE-RELATED OSTEOPOROSIS WITHOUT CURRENT PATHOLOGICAL FRACTURE: ICD-10-CM

## 2024-10-09 DIAGNOSIS — D50.9 IRON DEFICIENCY ANEMIA, UNSPECIFIED: ICD-10-CM

## 2024-10-09 LAB
ALBUMIN SERPL BCG-MCNC: 4.7 G/DL (ref 3.5–5.2)
ALP SERPL-CCNC: 54 U/L (ref 40–150)
ALT SERPL W P-5'-P-CCNC: 30 U/L (ref 0–50)
ANION GAP SERPL CALCULATED.3IONS-SCNC: 15 MMOL/L (ref 7–15)
AST SERPL W P-5'-P-CCNC: 25 U/L (ref 0–45)
BILIRUB SERPL-MCNC: 0.5 MG/DL
BUN SERPL-MCNC: 9.6 MG/DL (ref 8–23)
CALCIUM SERPL-MCNC: 9.5 MG/DL (ref 8.8–10.4)
CHLORIDE SERPL-SCNC: 102 MMOL/L (ref 98–107)
CHOLEST SERPL-MCNC: 220 MG/DL
CREAT SERPL-MCNC: 0.67 MG/DL (ref 0.51–0.95)
EGFRCR SERPLBLD CKD-EPI 2021: >90 ML/MIN/1.73M2
ERYTHROCYTE [DISTWIDTH] IN BLOOD BY AUTOMATED COUNT: 14.3 % (ref 10–15)
FASTING STATUS PATIENT QL REPORTED: ABNORMAL
FASTING STATUS PATIENT QL REPORTED: ABNORMAL
GLUCOSE SERPL-MCNC: 80 MG/DL (ref 70–99)
HCO3 SERPL-SCNC: 21 MMOL/L (ref 22–29)
HCT VFR BLD AUTO: 44.4 % (ref 35–47)
HDLC SERPL-MCNC: 59 MG/DL
HGB BLD-MCNC: 14 G/DL (ref 11.7–15.7)
IRON BINDING CAPACITY (ROCHE): 325 UG/DL (ref 240–430)
IRON SATN MFR SERPL: 42 % (ref 15–46)
IRON SERPL-MCNC: 137 UG/DL (ref 37–145)
LDLC SERPL CALC-MCNC: 118 MG/DL
MCH RBC QN AUTO: 27.2 PG (ref 26.5–33)
MCHC RBC AUTO-ENTMCNC: 31.5 G/DL (ref 31.5–36.5)
MCV RBC AUTO: 86 FL (ref 78–100)
NONHDLC SERPL-MCNC: 161 MG/DL
PLATELET # BLD AUTO: 210 10E3/UL (ref 150–450)
POTASSIUM SERPL-SCNC: 3.9 MMOL/L (ref 3.4–5.3)
PROT SERPL-MCNC: 7.8 G/DL (ref 6.4–8.3)
RBC # BLD AUTO: 5.15 10E6/UL (ref 3.8–5.2)
SODIUM SERPL-SCNC: 138 MMOL/L (ref 135–145)
TRIGL SERPL-MCNC: 214 MG/DL
VIT B12 SERPL-MCNC: 671 PG/ML (ref 232–1245)
VIT D+METAB SERPL-MCNC: 57 NG/ML (ref 20–50)
WBC # BLD AUTO: 6.4 10E3/UL (ref 4–11)

## 2024-10-09 PROCEDURE — 85018 HEMOGLOBIN: CPT | Performed by: PHYSICIAN ASSISTANT

## 2024-10-09 PROCEDURE — 82306 VITAMIN D 25 HYDROXY: CPT | Performed by: PHYSICIAN ASSISTANT

## 2024-10-09 PROCEDURE — 80053 COMPREHEN METABOLIC PANEL: CPT | Performed by: PHYSICIAN ASSISTANT

## 2024-10-09 PROCEDURE — 80061 LIPID PANEL: CPT | Performed by: PHYSICIAN ASSISTANT

## 2024-10-09 PROCEDURE — 83550 IRON BINDING TEST: CPT | Performed by: PHYSICIAN ASSISTANT

## 2024-10-09 PROCEDURE — 82607 VITAMIN B-12: CPT | Performed by: PHYSICIAN ASSISTANT

## 2024-12-28 ENCOUNTER — HEALTH MAINTENANCE LETTER (OUTPATIENT)
Age: 64
End: 2024-12-28

## 2025-04-13 ENCOUNTER — HEALTH MAINTENANCE LETTER (OUTPATIENT)
Age: 65
End: 2025-04-13

## 2025-07-19 ASSESSMENT — ASTHMA QUESTIONNAIRES
QUESTION_3 LAST FOUR WEEKS HOW OFTEN DID YOUR ASTHMA SYMPTOMS (WHEEZING, COUGHING, SHORTNESS OF BREATH, CHEST TIGHTNESS OR PAIN) WAKE YOU UP AT NIGHT OR EARLIER THAN USUAL IN THE MORNING: NOT AT ALL
QUESTION_4 LAST FOUR WEEKS HOW OFTEN HAVE YOU USED YOUR RESCUE INHALER OR NEBULIZER MEDICATION (SUCH AS ALBUTEROL): NOT AT ALL
ACT_TOTALSCORE: 25
QUESTION_1 LAST FOUR WEEKS HOW MUCH OF THE TIME DID YOUR ASTHMA KEEP YOU FROM GETTING AS MUCH DONE AT WORK, SCHOOL OR AT HOME: NONE OF THE TIME
QUESTION_2 LAST FOUR WEEKS HOW OFTEN HAVE YOU HAD SHORTNESS OF BREATH: NOT AT ALL
QUESTION_5 LAST FOUR WEEKS HOW WOULD YOU RATE YOUR ASTHMA CONTROL: COMPLETELY CONTROLLED

## 2025-07-23 ENCOUNTER — OFFICE VISIT (OUTPATIENT)
Dept: PULMONOLOGY | Facility: CLINIC | Age: 65
End: 2025-07-23
Attending: NURSE PRACTITIONER
Payer: COMMERCIAL

## 2025-07-23 VITALS
OXYGEN SATURATION: 98 % | WEIGHT: 146 LBS | BODY MASS INDEX: 24.67 KG/M2 | DIASTOLIC BLOOD PRESSURE: 76 MMHG | HEART RATE: 84 BPM | SYSTOLIC BLOOD PRESSURE: 108 MMHG

## 2025-07-23 DIAGNOSIS — J45.20 MILD INTERMITTENT ASTHMA WITHOUT COMPLICATION: Primary | ICD-10-CM

## 2025-07-23 PROCEDURE — 3078F DIAST BP <80 MM HG: CPT | Performed by: NURSE PRACTITIONER

## 2025-07-23 PROCEDURE — 3074F SYST BP LT 130 MM HG: CPT | Performed by: NURSE PRACTITIONER

## 2025-07-23 PROCEDURE — 99213 OFFICE O/P EST LOW 20 MIN: CPT | Performed by: NURSE PRACTITIONER

## 2025-07-23 RX ORDER — SODIUM FLUORIDE 6 MG/ML
PASTE, DENTIFRICE DENTAL
COMMUNITY
Start: 2025-02-08

## 2025-07-23 RX ORDER — FLUTICASONE PROPIONATE 50 MCG
SPRAY, SUSPENSION (ML) NASAL
COMMUNITY
Start: 1990-07-19

## 2025-07-23 RX ORDER — GABAPENTIN 100 MG/1
100 CAPSULE ORAL 2 TIMES DAILY
COMMUNITY
Start: 2024-03-19

## 2025-07-23 RX ORDER — ACETAMINOPHEN 500 MG
TABLET ORAL
COMMUNITY
Start: 1990-07-19

## 2025-07-23 RX ORDER — AMLODIPINE BESYLATE 5 MG/1
TABLET ORAL
COMMUNITY
Start: 2025-01-19

## 2025-07-23 RX ORDER — METHYLPREDNISOLONE 4 MG/1
TABLET ORAL
COMMUNITY
Start: 2025-07-22

## 2025-07-23 RX ORDER — TIRZEPATIDE 2.5 MG/.5ML
INJECTION, SOLUTION SUBCUTANEOUS
COMMUNITY
Start: 2025-01-19

## 2025-07-23 NOTE — PATIENT INSTRUCTIONS
It was a pleasure to see you in clinic today.   Here is what we discussed:    Take asmanex as needed, restart this if you have an upper respiratory infection or your symptoms worsen.  Call [phone] with any change or worsening of your breathing, ask to speak to the lung clinic nurse.   Follow-up as needed.     Coby Alfonso, CNP  Pulmonary Medicine  [facility]  [phone]

## 2025-07-23 NOTE — PROGRESS NOTES
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    Mild intermittent asthma  Pulmonary nodules  Environmental allergies  Her history, imaging, and pulmonary function testing suggest possible asthma, with mosaic attenuation, chronic sputum production, significant atopic history, and history of wheezing.   Lives in [place] fall-spring.  Multiple environmental allergies; respiratory allergen-specific IgE panel in Augusts 2022 showed mild elevation to most trees, weeds, dust mites.    She has tiny nodules that have been stable over time, and quit smoking in 1998 so does not qualify for annual low-dose chest CT.   Since last visit, she has lost weight and weaned off Asmanex.     Plan:  - discussed restarting Asmanex with any URIs or if symptoms worsen.   - continue fexofenadine 180 mg daily   - continue albuterol HFA as needed  - Action plan: prednisone 20mg x7 days (had multiple SEs from higher doses of prednisone)      Follow-up  - as needed      Coby Alfonso CNP  Pulmonary Medicine  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    Since last visit, she has lost 30 lbs and has weaned off her Asmanex.    No increase in symptoms since she stopped.  She has been able to get off multiple medications.   No bad URIs or exacerbations since last visit.             10/26/2022    11:00 AM 6/6/2024     2:19 PM 7/19/2025     9:26 AM   ACT Total Scores   ACT TOTAL SCORE (Goal Greater than or Equal to 20) 24 25 25    In the past 12 months, how many times did you visit the emergency room for your asthma without being admitted to the hospital? 0 0 0   In the past 12 months, how many times were you hospitalized overnight because of your asthma? 0 0 0       Patient-reported          ROS:     10-point ROS performed and is negative aside from those listed in HPI.       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    Allergies:  Allergies   Allergen Reactions    Haemophilus B Polysaccharide Vaccine Anaphylaxis    Influenza Virus Vaccine Anaphylaxis    Activated Charcoal Headache    Allergy Multi-Symptom Night [Diphenhydramine-Pe-Apap] Headache    Duloxetine Other (See Comments)     Felt weird    Melatonin Headache    Mesalamine Headache and Other (See Comments)    Prednisone Other (See Comments)     Feel weird     Pseudoephedrine     Skyrizi [Risankizumab] Other (See Comments)     Upper respiratory infection    Stelara [Ustekinumab (Murine)]      Fullness in throat    Sulfasalazine Headache    Triamcinolone Headache    Erythromycin Headache and Nausea and Vomiting       Family HX:  Family History   Problem Relation Age of Onset    Hereditary Breast and Ovarian Cancer Syndrome Mother     Breast Cancer  Mother 43.00    Ovarian Cancer Mother 69.00    Hereditary Breast and Ovarian Cancer Syndrome Maternal Grandmother     Ovarian Cancer Maternal Grandmother        Social Hx:  Social History     Socioeconomic History    Marital status:      Spouse name: Not on file    Number of children: Not on file    Years of education: Not on file    Highest education level: Not on file   Occupational History    Not on file   Tobacco Use    Smoking status: Former     Types: Cigarettes    Smokeless tobacco: Never   Vaping Use    Vaping status: Never Used   Substance and Sexual Activity    Alcohol use: Not on file    Drug use: Not on file    Sexual activity: Not on file   Other Topics Concern    Not on file   Social History Narrative    Not on file     Social Drivers of Health     Financial Resource Strain: Low Risk  (5/25/2023)    Received from [facility]    Overall Financial Resource Strain (CARDIA)     Difficulty of Paying Living Expenses: Not hard at all   Food Insecurity: No Food Insecurity (5/25/2023)    Received from [facility]    Hunger Vital Sign     Worried About Running Out of Food in the Last Year: Never true     Ran Out of Food in the Last Year: Never true   Transportation Needs: No Transportation Needs (5/25/2023)    Received from [facility]    PRAPARE - Transportation     Lack of Transportation (Medical): No     Lack of Transportation (Non-Medical): No   Physical Activity: Sufficiently Active (5/25/2023)    Received from [facility]    Exercise Vital Sign     Days of Exercise per Week: 5 days     Minutes of Exercise per Session: 30 min   Stress: No Stress Concern Present (5/25/2023)    Received from [facility]    [employer] [place] [employer] - Occupational Stress Questionnaire     Feeling of Stress : Not at all   Social Connections: Unknown (5/25/2023)    Received from [facility]    Social Connection and Isolation Panel [NHANES]     Frequency of Communication with Friends and Family: More than  three times a week     Frequency of Social Gatherings with Friends and Family: More than three times a week     Attends Evangelical Services: Patient declined     Active Member of Clubs or Organizations: No     Attends Club or Organization Meetings: Never     Marital Status:    Interpersonal Safety: Not At Risk (5/25/2023)    Received from [facility]    Humiliation, Afraid, Rape, and Kick questionnaire     Fear of Current or Ex-Partner: No     Emotionally Abused: No     Physically Abused: No     Sexually Abused: No   Housing Stability: Low Risk  (3/31/2022)    Received from [facility]    Housing Stability Vital Sign     Unable to Pay for Housing in the Last Year: No     Number of Places Lived in the Last Year: 2     Unstable Housing in the Last Year: No       Current Meds:  Current Outpatient Medications   Medication Sig Dispense Refill    acetaminophen (TYLENOL) 500 MG tablet       alendronate (FOSAMAX) 70 MG tablet Take 70 mg by mouth every 7 days      amLODIPine (NORVASC) 5 MG tablet       biotin 1000 MCG TABS tablet Take 1,000 mcg by mouth daily      calcium carbonate 750 MG CHEW Take 750 mg by mouth daily (Calcium-vitamin D-magnesium)      ferrous fumarate 65 mg, [place]. FE,-Vitamin C 125 mg (VITRON C)  MG TABS tablet Take 1 tablet by mouth daily      fexofenadine (ALLEGRA) 180 MG tablet Take by mouth every 24 hours      fluticasone (FLONASE) 50 MCG/ACT nasal spray       gabapentin (NEURONTIN) 100 MG capsule Take 100 mg by mouth 2 times daily.      magnesium hydroxide (MOM) 2400 MG/10ML SUSP Take 5 mLs by mouth daily as needed for constipation      methylPREDNISolone (MEDROL DOSEPAK) 4 MG tablet therapy pack       omeprazole (PRILOSEC) 20 MG DR capsule Take 20 mg by mouth daily      rosuvastatin (CRESTOR) 5 MG tablet Take 5 mg by mouth daily      SODIUM FLUORIDE 5000 PPM 1.1 % PSTE dental paste USE TWICE DAILY AS DIRECTED      TIRZEPATIDE 2.5 MG/0.5ML vial Inject 0.5 mL every week by subcutaneous  route for 28 days.      traZODone (DESYREL) 50 MG tablet Take 50 mg by mouth At Bedtime      Turmeric Extra Strength CAPS       vitamin B-12 (CYANOCOBALAMIN) 500 MCG tablet Take 500 mcg by mouth daily      Vitamin D3 (CHOLECALCIFEROL) 125 MCG (5000 UT) tablet Take 125 mcg by mouth daily      albuterol (PROAIR HFA/PROVENTIL HFA/VENTOLIN HFA) 108 (90 Base) MCG/ACT inhaler Inhale 2 puffs into the lungs every 6 hours as needed for shortness of breath / dyspnea or wheezing      Ascorbic Acid (VITAMIN C PO) Take 1,000 mg by mouth 2 times daily      epinastine HCl (ELESTAT) 0.05 % SOLN Place 1 drop into both eyes 2 times daily      EPINEPHrine (ANY BX GENERIC EQUIV) 0.3 MG/0.3ML injection 2-pack Inject 0.3 mg into the muscle as needed for anaphylaxis May repeat one time in 5-15 minutes if response to initial dose is inadequate. (Patient not taking: Reported on 7/23/2025)      guaiFENesin (MUCINEX) 600 MG 12 hr tablet Take 1,200 mg by mouth 2 times daily as needed for congestion      mometasone (ASMANEX TWISTHALER) 220 MCG/ACT inhaler Inhale 1 puff into the lungs 2 times daily. OFFICE VISIT NEEDED FOR ANY FURTHER REFILLS 1 each 0        Physical Exam:     /76 (BP Location: Left arm, Patient Position: Sitting, Cuff Size: Adult Large)   Pulse 84   Wt 66.2 kg (146 lb)   SpO2 98%   BMI 24.67 kg/m    Gen: adult female, appears in NAD  HEENT: clear conjunctivae, moist mucous membranes  Resp: CTAB, no focal crackles or wheezes.  Respirations even and unlabored.  On RA. No cough.  Skin: no apparent rashes on visible skin  Ext: no cyanosis, clubbing or edema  Neuro: alert and answering questions appropriately       Data:         Imaging studies:    Chest CT (September 2022):                                         IMPRESSION:   1.  Mild mosaic attenuation the lungs suggestive of air trapping and/or small airways disease.  2.  Mild bronchial wall thickening.  3.  Scattered pulmonary micronodules measuring up to 3 mm.  Follow-up guidelines as below.  AW addendum: Multiple tiny nodules appear stable compared to chest CTs from [place] over many years    Pulmonary Function Testing  October 2022

## 2025-08-18 ENCOUNTER — LAB REQUISITION (OUTPATIENT)
Dept: LAB | Facility: CLINIC | Age: 65
End: 2025-08-18

## 2025-08-18 DIAGNOSIS — K51.90 ULCERATIVE COLITIS, UNSPECIFIED, WITHOUT COMPLICATIONS (H): ICD-10-CM

## 2025-08-18 LAB
ALBUMIN SERPL BCG-MCNC: 4.6 G/DL (ref 3.5–5.2)
ALP SERPL-CCNC: 54 U/L (ref 40–150)
ALT SERPL W P-5'-P-CCNC: 20 U/L (ref 0–50)
ANION GAP SERPL CALCULATED.3IONS-SCNC: 11 MMOL/L (ref 7–15)
AST SERPL W P-5'-P-CCNC: 24 U/L (ref 0–45)
BASOPHILS # BLD AUTO: 0.05 10E3/UL (ref 0–0.2)
BASOPHILS NFR BLD AUTO: 0.9 %
BILIRUB SERPL-MCNC: 0.7 MG/DL
BUN SERPL-MCNC: 6.7 MG/DL (ref 8–23)
CALCIUM SERPL-MCNC: 9.7 MG/DL (ref 8.8–10.4)
CHLORIDE SERPL-SCNC: 100 MMOL/L (ref 98–107)
CREAT SERPL-MCNC: 0.67 MG/DL (ref 0.51–0.95)
CRP SERPL-MCNC: <3 MG/L
EGFRCR SERPLBLD CKD-EPI 2021: >90 ML/MIN/1.73M2
EOSINOPHIL # BLD AUTO: 0.09 10E3/UL (ref 0–0.7)
EOSINOPHIL NFR BLD AUTO: 1.7 %
ERYTHROCYTE [DISTWIDTH] IN BLOOD BY AUTOMATED COUNT: 14.1 % (ref 10–15)
FERRITIN SERPL-MCNC: 101 NG/ML (ref 11–328)
GLUCOSE SERPL-MCNC: 83 MG/DL (ref 70–99)
HCO3 SERPL-SCNC: 25 MMOL/L (ref 22–29)
HCT VFR BLD AUTO: 41.5 % (ref 35–47)
HGB BLD-MCNC: 13.1 G/DL (ref 11.7–15.7)
IMM GRANULOCYTES # BLD: <0.03 10E3/UL
IMM GRANULOCYTES NFR BLD: 0.2 %
IRON BINDING CAPACITY (ROCHE): 274 UG/DL (ref 240–430)
IRON SATN MFR SERPL: 33 % (ref 15–46)
IRON SERPL-MCNC: 90 UG/DL (ref 37–145)
LYMPHOCYTES # BLD AUTO: 1.46 10E3/UL (ref 0.8–5.3)
LYMPHOCYTES NFR BLD AUTO: 27.2 %
MCH RBC QN AUTO: 27.2 PG (ref 26.5–33)
MCHC RBC AUTO-ENTMCNC: 31.6 G/DL (ref 31.5–36.5)
MCV RBC AUTO: 86.1 FL (ref 78–100)
MONOCYTES # BLD AUTO: 0.5 10E3/UL (ref 0–1.3)
MONOCYTES NFR BLD AUTO: 9.3 %
NEUTROPHILS # BLD AUTO: 3.26 10E3/UL (ref 1.6–8.3)
NEUTROPHILS NFR BLD AUTO: 60.7 %
NRBC # BLD AUTO: <0.03 10E3/UL
NRBC BLD AUTO-RTO: 0 /100
PLATELET # BLD AUTO: 277 10E3/UL (ref 150–450)
POTASSIUM SERPL-SCNC: 4 MMOL/L (ref 3.4–5.3)
PROT SERPL-MCNC: 7.8 G/DL (ref 6.4–8.3)
RBC # BLD AUTO: 4.82 10E6/UL (ref 3.8–5.2)
SODIUM SERPL-SCNC: 136 MMOL/L (ref 135–145)
WBC # BLD AUTO: 5.37 10E3/UL (ref 4–11)

## 2025-08-18 PROCEDURE — 83550 IRON BINDING TEST: CPT | Performed by: PHYSICIAN ASSISTANT

## 2025-08-18 PROCEDURE — 82728 ASSAY OF FERRITIN: CPT | Performed by: PHYSICIAN ASSISTANT

## 2025-08-18 PROCEDURE — 86140 C-REACTIVE PROTEIN: CPT | Performed by: PHYSICIAN ASSISTANT

## 2025-08-18 PROCEDURE — 82947 ASSAY GLUCOSE BLOOD QUANT: CPT | Performed by: PHYSICIAN ASSISTANT

## 2025-08-18 PROCEDURE — 85004 AUTOMATED DIFF WBC COUNT: CPT | Performed by: PHYSICIAN ASSISTANT
